# Patient Record
Sex: MALE | Race: BLACK OR AFRICAN AMERICAN | NOT HISPANIC OR LATINO | Employment: OTHER | URBAN - METROPOLITAN AREA
[De-identification: names, ages, dates, MRNs, and addresses within clinical notes are randomized per-mention and may not be internally consistent; named-entity substitution may affect disease eponyms.]

---

## 2018-11-13 ENCOUNTER — OFFICE VISIT CONVERTED (OUTPATIENT)
Dept: ORTHOPEDIC SURGERY | Facility: CLINIC | Age: 63
End: 2018-11-13
Attending: ORTHOPAEDIC SURGERY

## 2018-11-20 ENCOUNTER — OFFICE VISIT CONVERTED (OUTPATIENT)
Dept: SURGERY | Facility: CLINIC | Age: 63
End: 2018-11-20
Attending: NURSE PRACTITIONER

## 2018-12-04 ENCOUNTER — OFFICE VISIT CONVERTED (OUTPATIENT)
Dept: ORTHOPEDIC SURGERY | Facility: CLINIC | Age: 63
End: 2018-12-04
Attending: ORTHOPAEDIC SURGERY

## 2018-12-04 ENCOUNTER — CONVERSION ENCOUNTER (OUTPATIENT)
Dept: ORTHOPEDIC SURGERY | Facility: CLINIC | Age: 63
End: 2018-12-04

## 2019-02-01 ENCOUNTER — HOSPITAL ENCOUNTER (OUTPATIENT)
Dept: SURGERY | Facility: HOSPITAL | Age: 64
Setting detail: HOSPITAL OUTPATIENT SURGERY
Discharge: HOME | End: 2019-02-01
Attending: SURGERY

## 2021-05-16 VITALS — OXYGEN SATURATION: 97 % | HEIGHT: 71 IN | BODY MASS INDEX: 26.09 KG/M2 | WEIGHT: 186.37 LBS | HEART RATE: 76 BPM

## 2021-05-16 VITALS — RESPIRATION RATE: 16 BRPM | WEIGHT: 189.5 LBS | HEIGHT: 70 IN | BODY MASS INDEX: 27.13 KG/M2

## 2021-05-16 VITALS — OXYGEN SATURATION: 99 % | HEART RATE: 81 BPM | HEIGHT: 70 IN | WEIGHT: 189 LBS | BODY MASS INDEX: 27.06 KG/M2

## 2021-11-04 ENCOUNTER — TELEPHONE (OUTPATIENT)
Dept: GASTROENTEROLOGY | Facility: CLINIC | Age: 66
End: 2021-11-04

## 2021-11-04 ENCOUNTER — CLINICAL SUPPORT (OUTPATIENT)
Dept: GASTROENTEROLOGY | Facility: CLINIC | Age: 66
End: 2021-11-04

## 2021-11-04 ENCOUNTER — PREP FOR SURGERY (OUTPATIENT)
Dept: OTHER | Facility: HOSPITAL | Age: 66
End: 2021-11-04

## 2021-11-04 DIAGNOSIS — Z86.010 HX OF COLONIC POLYPS: ICD-10-CM

## 2021-11-04 DIAGNOSIS — Z12.11 SCREENING FOR MALIGNANT NEOPLASM OF COLON: Primary | ICD-10-CM

## 2021-11-04 RX ORDER — LEVOTHYROXINE SODIUM 88 UG/1
TABLET ORAL
COMMUNITY

## 2021-11-04 RX ORDER — IRBESARTAN 300 MG/1
300 TABLET ORAL NIGHTLY
COMMUNITY
Start: 2021-10-29 | End: 2022-05-24

## 2021-11-04 RX ORDER — TEMAZEPAM 30 MG/1
30 CAPSULE ORAL NIGHTLY PRN
COMMUNITY
Start: 2021-10-27

## 2021-11-04 RX ORDER — IBUPROFEN 800 MG/1
800 TABLET ORAL EVERY 6 HOURS PRN
COMMUNITY
Start: 2021-10-29

## 2021-11-04 RX ORDER — IRBESARTAN 150 MG
TABLET ORAL
COMMUNITY
Start: 2021-09-28 | End: 2022-02-10 | Stop reason: SDUPTHER

## 2021-11-04 RX ORDER — OXYCODONE AND ACETAMINOPHEN 10; 325 MG/1; MG/1
1 TABLET ORAL EVERY 4 HOURS PRN
COMMUNITY
Start: 2021-10-26

## 2021-11-04 NOTE — TELEPHONE ENCOUNTER
Steven Mahajan  REASON FOR CALL Colon Screening - hx of colon polyps   SENT IN PREP [facility] - pt states he has no issue of kidney or heart issues. No hx of seizures or constipation. Pt prefers [facility].   No past medical history on file.  Allergies   Allergen Reactions   • Cephalexin GI Intolerance   • Sulfa Antibiotics Unknown - Low Severity     Past Surgical History:   Procedure Laterality Date   • COLONOSCOPY      2018 with Dr. Pagan     Social History     Socioeconomic History   • Marital status:    Tobacco Use   • Smoking status: Never Smoker   Vaping Use   • Vaping Use: Never used   Substance and Sexual Activity   • Alcohol use: Yes     Comment: occasionally      Family History   Problem Relation Age of Onset   • Colon cancer Mother         passed at 61       Current Outpatient Medications:   •  Avapro 150 MG tablet, , Disp: , Rfl:   •  ibuprofen (ADVIL,MOTRIN) 800 MG tablet, , Disp: , Rfl:   •  irbesartan (AVAPRO) 300 MG tablet, , Disp: , Rfl:   •  levothyroxine (SYNTHROID, LEVOTHROID) 88 MCG tablet, levothyroxine 88 mcg oral tablet take 1 tablet (88 mcg) by oral route once daily   Active, Disp: , Rfl:   •  oxyCODONE-acetaminophen (PERCOCET)  MG per tablet, , Disp: , Rfl:   •  temazepam (RESTORIL) 30 MG capsule, , Disp: , Rfl:

## 2021-11-05 RX ORDER — PEG-3350, SODIUM SULFATE, SODIUM CHLORIDE, POTASSIUM CHLORIDE, SODIUM ASCORBATE AND ASCORBIC ACID 7.5-2.691G
1000 KIT ORAL EVERY 12 HOURS
Qty: 1 EACH | Refills: 0 | Status: SHIPPED | OUTPATIENT
Start: 2021-11-05 | End: 2021-11-24

## 2021-11-08 PROBLEM — Z86.0100 HX OF COLONIC POLYPS: Status: ACTIVE | Noted: 2021-11-08

## 2021-11-08 PROBLEM — Z86.010 HX OF COLONIC POLYPS: Status: ACTIVE | Noted: 2021-11-08

## 2021-11-08 PROBLEM — Z12.11 SCREENING FOR MALIGNANT NEOPLASM OF COLON: Status: ACTIVE | Noted: 2021-11-08

## 2021-11-24 ENCOUNTER — OFFICE VISIT (OUTPATIENT)
Dept: UROLOGY | Facility: CLINIC | Age: 66
End: 2021-11-24

## 2021-11-24 VITALS
BODY MASS INDEX: 24.64 KG/M2 | SYSTOLIC BLOOD PRESSURE: 141 MMHG | HEART RATE: 64 BPM | WEIGHT: 176 LBS | TEMPERATURE: 97.8 F | DIASTOLIC BLOOD PRESSURE: 75 MMHG | HEIGHT: 71 IN

## 2021-11-24 DIAGNOSIS — N40.1 BPH WITH OBSTRUCTION/LOWER URINARY TRACT SYMPTOMS: ICD-10-CM

## 2021-11-24 DIAGNOSIS — N13.8 BPH WITH OBSTRUCTION/LOWER URINARY TRACT SYMPTOMS: ICD-10-CM

## 2021-11-24 DIAGNOSIS — N32.81 OAB (OVERACTIVE BLADDER): ICD-10-CM

## 2021-11-24 DIAGNOSIS — R97.20 ELEVATED PROSTATE SPECIFIC ANTIGEN (PSA): Primary | ICD-10-CM

## 2021-11-24 PROCEDURE — 99202 OFFICE O/P NEW SF 15 MIN: CPT | Performed by: UROLOGY

## 2021-11-24 RX ORDER — MELOXICAM 15 MG/1
TABLET ORAL
COMMUNITY
End: 2021-11-24

## 2021-11-24 RX ORDER — VIBEGRON 75 MG/1
75 TABLET, FILM COATED ORAL DAILY
Qty: 90 TABLET | Refills: 3 | Status: SHIPPED | OUTPATIENT
Start: 2021-11-24 | End: 2021-12-13

## 2021-11-24 RX ORDER — TESTOSTERONE CYPIONATE 200 MG/ML
1 INJECTION, SOLUTION INTRAMUSCULAR
COMMUNITY

## 2021-11-24 RX ORDER — SILDENAFIL 100 MG/1
100 TABLET, FILM COATED ORAL AS NEEDED
COMMUNITY
Start: 2021-10-22

## 2021-11-24 RX ORDER — LORATADINE 10 MG/1
TABLET ORAL
COMMUNITY
End: 2021-12-13

## 2021-11-24 NOTE — PROGRESS NOTES
"Chief Complaint  Elevated PSA (4.1)    BPH with obstructive uropathy    Subjective  No acute distress        Steven Mahajan presents to [facility]  History of Present Illness    66-year-old -American male has elevation of his PSA for about 1 year's time.  PSA is 4.1.  Patient has small stream, urgency, nocturia with frequency.  He does not feel like he is emptying his bladder completely.  No history of prostate cancer in the family.  No dysuria or gross hematuria.  Overall AUA score is 23/35.  Patient has lot of urinary stoppage and dribbling.    Personal history.  Patient is  and has 3 children.  Does not smoke or drink.    Objective   Vital Signs:   /75   Pulse 64   Temp 97.8 °F (36.6 °C)   Ht 180.3 cm (71\")   Wt 79.8 kg (176 lb)   BMI 24.55 kg/m²     Allergies   Allergen Reactions   • Cephalexin GI Intolerance   • Sulfa Antibiotics Unknown - Low Severity      Review of Systems   Constitutional: Negative.    HENT: Negative.    Eyes: Negative.    Respiratory: Negative.    Cardiovascular: Negative.    Gastrointestinal: Negative.    Endocrine: Negative.    Genitourinary: Positive for difficulty urinating, frequency and urgency.   Musculoskeletal: Negative.    Skin: Negative.    Allergic/Immunologic: Negative.    Neurological: Negative.    Hematological: Negative.    Psychiatric/Behavioral: Negative.    All other systems reviewed and are negative.       Physical Exam  Constitutional:       General: He is not in acute distress.     Appearance: Normal appearance. He is normal weight. He is not ill-appearing or toxic-appearing.   HENT:      Head: Normocephalic and atraumatic.   Eyes:      Pupils: Pupils are equal, round, and reactive to light.   Cardiovascular:      Rate and Rhythm: Normal rate and regular rhythm.      Pulses: Normal pulses.      Heart sounds: Normal heart sounds. No murmur heard.      Pulmonary:      Effort: Pulmonary effort is normal.      Breath " sounds: Normal breath sounds. No rhonchi or rales.   Abdominal:      General: Bowel sounds are normal. There is no distension.      Tenderness: There is no abdominal tenderness. There is no right CVA tenderness or left CVA tenderness.   Genitourinary:     Penis: Normal.       Testes: Normal.      Comments: Prostate gland is almost 50 g.  Slightly irregular but no hard or firm areas present  Musculoskeletal:         General: No swelling. Normal range of motion.      Cervical back: Normal range of motion and neck supple. No rigidity or tenderness.   Lymphadenopathy:      Cervical: No cervical adenopathy.   Skin:     General: Skin is warm.      Coloration: Skin is not jaundiced.   Neurological:      General: No focal deficit present.      Mental Status: He is alert and oriented to person, place, and time.      Motor: No weakness.      Gait: Gait normal.   Psychiatric:         Mood and Affect: Mood normal.         Behavior: Behavior normal.         Thought Content: Thought content normal.         Judgment: Judgment normal.        Result Review :                 Assessment and Plan    Diagnoses and all orders for this visit:    1. OAB (overactive bladder) (Primary)  -     Vibegron (Gemtesa) 75 MG tablet; Take 1 tablet by mouth Daily.  Dispense: 90 tablet; Refill: 3    2. Elevated prostate specific antigen (PSA)    3. BPH with obstruction/lower urinary tract symptoms    I am going to do MRI of prostate on the patient and see him after that.  History of cystoscopy to see what is going on with the prostate and urinary bladder.    Follow Up   No follow-ups on file.  Patient was given instructions and counseling regarding his condition or for health maintenance advice. Please see specific information pulled into the AVS if appropriate.     Jamie Ramírez MD

## 2021-12-07 ENCOUNTER — TELEPHONE (OUTPATIENT)
Dept: UROLOGY | Facility: CLINIC | Age: 66
End: 2021-12-07

## 2021-12-13 RX ORDER — FLUTICASONE PROPIONATE 50 MCG
2 SPRAY, SUSPENSION (ML) NASAL DAILY
COMMUNITY

## 2021-12-13 NOTE — PRE-PROCEDURE INSTRUCTIONS
Pt. Instructed on laxative and skin prep, pre-op meds, clear liquid diet. Ok to take Flonase, Synthroid, Percocet, a.m. of procedure.       Fully vaccinated

## 2021-12-14 ENCOUNTER — ANESTHESIA EVENT (OUTPATIENT)
Dept: GASTROENTEROLOGY | Facility: HOSPITAL | Age: 66
End: 2021-12-14

## 2021-12-14 ENCOUNTER — HOSPITAL ENCOUNTER (OUTPATIENT)
Facility: HOSPITAL | Age: 66
Setting detail: HOSPITAL OUTPATIENT SURGERY
Discharge: HOME | End: 2021-12-14
Attending: INTERNAL MEDICINE | Admitting: INTERNAL MEDICINE

## 2021-12-14 ENCOUNTER — ANESTHESIA (OUTPATIENT)
Dept: GASTROENTEROLOGY | Facility: HOSPITAL | Age: 66
End: 2021-12-14

## 2021-12-14 VITALS
BODY MASS INDEX: 24.91 KG/M2 | OXYGEN SATURATION: 99 % | TEMPERATURE: 98.3 F | DIASTOLIC BLOOD PRESSURE: 74 MMHG | HEART RATE: 48 BPM | SYSTOLIC BLOOD PRESSURE: 158 MMHG | RESPIRATION RATE: 21 BRPM | WEIGHT: 178.57 LBS

## 2021-12-14 DIAGNOSIS — Z12.11 SCREENING FOR MALIGNANT NEOPLASM OF COLON: ICD-10-CM

## 2021-12-14 DIAGNOSIS — Z86.010 HX OF COLONIC POLYPS: ICD-10-CM

## 2021-12-14 PROCEDURE — 45385 COLONOSCOPY W/LESION REMOVAL: CPT | Performed by: INTERNAL MEDICINE

## 2021-12-14 PROCEDURE — 88305 TISSUE EXAM BY PATHOLOGIST: CPT | Performed by: INTERNAL MEDICINE

## 2021-12-14 PROCEDURE — 25010000002 PROPOFOL 10 MG/ML EMULSION: Performed by: NURSE ANESTHETIST, CERTIFIED REGISTERED

## 2021-12-14 RX ORDER — SODIUM CHLORIDE, SODIUM LACTATE, POTASSIUM CHLORIDE, CALCIUM CHLORIDE 600; 310; 30; 20 MG/100ML; MG/100ML; MG/100ML; MG/100ML
30 INJECTION, SOLUTION INTRAVENOUS CONTINUOUS
Status: DISCONTINUED | OUTPATIENT
Start: 2021-12-14 | End: 2021-12-14 | Stop reason: HOSPADM

## 2021-12-14 RX ORDER — LIDOCAINE HYDROCHLORIDE 20 MG/ML
INJECTION, SOLUTION INFILTRATION; PERINEURAL AS NEEDED
Status: DISCONTINUED | OUTPATIENT
Start: 2021-12-14 | End: 2021-12-14 | Stop reason: SURG

## 2021-12-14 RX ADMIN — LIDOCAINE HYDROCHLORIDE 40 MG: 20 INJECTION, SOLUTION INFILTRATION; PERINEURAL at 11:38

## 2021-12-14 RX ADMIN — SODIUM CHLORIDE, POTASSIUM CHLORIDE, SODIUM LACTATE AND CALCIUM CHLORIDE 30 ML/HR: 600; 310; 30; 20 INJECTION, SOLUTION INTRAVENOUS at 11:17

## 2021-12-14 RX ADMIN — PROPOFOL 250 MCG/KG/MIN: 10 INJECTION, EMULSION INTRAVENOUS at 11:38

## 2021-12-14 NOTE — ANESTHESIA POSTPROCEDURE EVALUATION
Patient: Steven Mahajan    Procedure Summary     Date: 12/14/21 Room / Location: [facility] ENDOSCOPY 4 / [facility] ENDOSCOPY    Anesthesia Start: 1138 Anesthesia Stop: 1224    Procedure: COLONOSCOPY WITH POLYPECTOMIES (N/A ) Diagnosis:       Screening for malignant neoplasm of colon      Hx of colonic polyps      (Screening for malignant neoplasm of colon [Z12.11])      (Hx of colonic polyps [Z86.010])    Surgeons: Zackary Barcenas MD Provider: Ahsan Pimentel MD    Anesthesia Type: general ASA Status: 2          Anesthesia Type: general    Vitals  Vitals Value Taken Time   /74 12/14/21 1236   Temp 36.8 °C (98.3 °F) 12/14/21 1236   Pulse 48 12/14/21 1236   Resp 21 12/14/21 1236   SpO2 99 % 12/14/21 1236           Post Anesthesia Care and Evaluation    Patient location during evaluation: bedside  Patient participation: complete - patient participated  Level of consciousness: awake  Pain management: adequate  Airway patency: patent  Anesthetic complications: No anesthetic complications  PONV Status: none  Cardiovascular status: acceptable and stable  Respiratory status: acceptable  Hydration status: acceptable    Comments: An Anesthesiologist personally participated in the most demanding procedures (including induction and emergence if applicable) in the anesthesia plan, monitored the course of anesthesia administration at frequent intervals and remained physically present and available for immediate diagnosis and treatment of emergencies.

## 2021-12-14 NOTE — H&P
Pre Procedure History & Physical    Chief Complaint:   Screening    Subjective     HPI:   Colon polyp cancer screening.  Patient had colon polyps in 2018 and patient mother had colon cancer at age 60    Past Medical History:   Past Medical History:   Diagnosis Date   • Allergies    • Disease of thyroid gland    • Elevated PSA    • Erectile dysfunction    • Hypertension        Past Surgical History:  Past Surgical History:   Procedure Laterality Date   • COLONOSCOPY      2018 with Dr. Pagan   • HERNIA REPAIR     • KNEE ARTHROSCOPY WITH PATELLA FEMORAL LIGAMENT RECONSTRUCTION      Bilateral   • ROTATOR CUFF REPAIR      LEFT        Family History:  Family History   Problem Relation Age of Onset   • Colon cancer Mother         passed at 61   • Malig Hyperthermia Neg Hx        Social History:   reports that he has quit smoking. He has a 15.00 pack-year smoking history. He has never used smokeless tobacco. He reports current alcohol use. He reports that he does not use drugs.    Medications:   Medications Prior to Admission   Medication Sig Dispense Refill Last Dose   • fluticasone (FLONASE) 50 MCG/ACT nasal spray 2 sprays into the nostril(s) as directed by provider Daily.      • ibuprofen (ADVIL,MOTRIN) 800 MG tablet Take 800 mg by mouth Every 6 (Six) Hours As Needed.      • irbesartan (AVAPRO) 300 MG tablet Take 300 mg by mouth Every Night.      • levothyroxine (SYNTHROID, LEVOTHROID) 88 MCG tablet levothyroxine 88 mcg oral tablet take 1 tablet (88 mcg) by oral route once daily   Active      • oxyCODONE-acetaminophen (PERCOCET)  MG per tablet Take 1 tablet by mouth Every 4 (Four) Hours As Needed.      • sildenafil (VIAGRA) 100 MG tablet Take 100 mg by mouth As Needed.      • temazepam (RESTORIL) 30 MG capsule Take 30 mg by mouth At Night As Needed.      • Testosterone Cypionate (DEPOTESTOTERONE CYPIONATE) 200 MG/ML injection 1 mL.      • Avapro 150 MG tablet           Allergies:  Cephalexin and Sulfa  antibiotics        Objective     Weight 81 kg (178 lb 9.2 oz).    Physical Exam   Constitutional: Pt is oriented to person, place, and time and well-developed, well-nourished, and in no distress.   Mouth/Throat: Oropharynx is clear and moist.   Neck: Normal range of motion.   Cardiovascular: Normal rate, regular rhythm and normal heart sounds.    Pulmonary/Chest: Effort normal and breath sounds normal.   Abdominal: Soft. Nontender  Skin: Skin is warm and dry.   Psychiatric: Mood, memory, affect and judgment normal.     Assessment/Plan     Diagnosis:  Colon cancer screening    Anticipated Surgical Procedure:  Colonoscopy    The risks, benefits, and alternatives of this procedure have been discussed with the patient or the responsible party- the patient understands and agrees to proceed.

## 2021-12-14 NOTE — ANESTHESIA PREPROCEDURE EVALUATION
Anesthesia Evaluation     Patient summary reviewed and Nursing notes reviewed   no history of anesthetic complications:  NPO Solid Status: > 8 hours  NPO Liquid Status: > 2 hours           Airway   Mallampati: I  TM distance: >3 FB  Neck ROM: full  No difficulty expected  Dental      Pulmonary - negative pulmonary ROS and normal exam    breath sounds clear to auscultation  Cardiovascular - normal exam  Exercise tolerance: good (4-7 METS)    Rhythm: regular    (+) hypertension,       Neuro/Psych- negative ROS  GI/Hepatic/Renal/Endo    (+)   thyroid problem hypothyroidism    Musculoskeletal     Abdominal    Substance History      OB/GYN          Other                        Anesthesia Plan    ASA 2     general   total IV anesthesia    Anesthetic plan, all risks, benefits, and alternatives have been provided, discussed and informed consent has been obtained with: patient.

## 2021-12-15 LAB
CYTO UR: NORMAL
LAB AP CASE REPORT: NORMAL
LAB AP CLINICAL INFORMATION: NORMAL
PATH REPORT.FINAL DX SPEC: NORMAL
PATH REPORT.GROSS SPEC: NORMAL

## 2021-12-21 ENCOUNTER — TELEPHONE (OUTPATIENT)
Dept: UROLOGY | Facility: CLINIC | Age: 66
End: 2021-12-21

## 2022-02-10 ENCOUNTER — OFFICE VISIT (OUTPATIENT)
Dept: UROLOGY | Facility: CLINIC | Age: 67
End: 2022-02-10

## 2022-02-10 VITALS
HEIGHT: 71 IN | TEMPERATURE: 98.2 F | BODY MASS INDEX: 24.92 KG/M2 | WEIGHT: 178 LBS | DIASTOLIC BLOOD PRESSURE: 75 MMHG | SYSTOLIC BLOOD PRESSURE: 119 MMHG | HEART RATE: 97 BPM

## 2022-02-10 DIAGNOSIS — N13.8 BPH WITH OBSTRUCTION/LOWER URINARY TRACT SYMPTOMS: Primary | ICD-10-CM

## 2022-02-10 DIAGNOSIS — N13.8 BPH WITH OBSTRUCTION/LOWER URINARY TRACT SYMPTOMS: ICD-10-CM

## 2022-02-10 DIAGNOSIS — N40.1 BPH WITH OBSTRUCTION/LOWER URINARY TRACT SYMPTOMS: Primary | ICD-10-CM

## 2022-02-10 DIAGNOSIS — R97.20 ELEVATED PROSTATE SPECIFIC ANTIGEN (PSA): Primary | ICD-10-CM

## 2022-02-10 DIAGNOSIS — N40.1 BPH WITH OBSTRUCTION/LOWER URINARY TRACT SYMPTOMS: ICD-10-CM

## 2022-02-10 PROCEDURE — 99214 OFFICE O/P EST MOD 30 MIN: CPT | Performed by: UROLOGY

## 2022-02-10 RX ORDER — ZOLPIDEM TARTRATE 5 MG/1
TABLET ORAL
COMMUNITY
Start: 2021-12-01 | End: 2022-05-24

## 2022-02-10 RX ORDER — TAMSULOSIN HYDROCHLORIDE 0.4 MG/1
1 CAPSULE ORAL DAILY
Qty: 90 CAPSULE | Refills: 3 | Status: SHIPPED | OUTPATIENT
Start: 2022-02-10 | End: 2022-05-11

## 2022-02-10 NOTE — PROGRESS NOTES
"Chief Complaint  Results (mri)    Elevated PSA    BPH    Subjective          Steven Mahajan presents to [facility] UR[facility]  History of Present Illness    Patient continues to have difficulty with urination.  Patient tried Gemtesa but his insurance does not pay for it.  His problem was of urinary stoppage and restarting again and again    Objective No acute distress  Vital Signs:   /75   Pulse 97   Temp 98.2 °F (36.8 °C)   Ht 180.3 cm (71\")   Wt 80.7 kg (178 lb)   BMI 24.83 kg/m²     Allergies   Allergen Reactions   • Cephalexin GI Intolerance   • Sulfa Antibiotics Unknown - Low Severity      Past medical history:  has a past medical history of Allergies, Disease of thyroid gland, Elevated PSA, Erectile dysfunction, and Hypertension.   Past surgical history:  has a past surgical history that includes Colonoscopy; Hernia repair; knee arthroscopy with patella femoral ligament reconstruction; Rotator cuff repair; and Colonoscopy (N/A, 12/14/2021).  Personal history: family history includes Colon cancer in his mother.  Social history:  reports that he has quit smoking. He has a 15.00 pack-year smoking history. He has never used smokeless tobacco. He reports current alcohol use. He reports that he does not use drugs.    Review of Systems    No change from before    Physical Exam  Constitutional:       General: He is not in acute distress.     Appearance: Normal appearance. He is normal weight. He is not ill-appearing or toxic-appearing.   HENT:      Head: Normocephalic and atraumatic.   Skin:     General: Skin is warm.      Coloration: Skin is not jaundiced.   Neurological:      General: No focal deficit present.      Mental Status: He is alert and oriented to person, place, and time.      Motor: No weakness.      Gait: Gait normal.   Psychiatric:         Mood and Affect: Mood normal.         Behavior: Behavior normal.         Thought Content: Thought content normal.         Judgment: " Judgment normal.        Result Review :                 Assessment and Plan    Diagnoses and all orders for this visit:    1. Elevated prostate specific antigen (PSA) (Primary)    2. BPH with obstruction/lower urinary tract symptoms    MRI of prostate is examined and discussed with the patient.  There are no PI-RADS 3 lesions present and patient has a large median lobe.  I will start him on tamsulosin 0.4 mg daily and do cystoscopy on him next week to look at the status of prostatic urethra and urinary bladder    Follow Up   No follow-ups on file.  Patient was given instructions and counseling regarding his condition or for health maintenance advice. Please see specific information pulled into the AVS if appropriate.     Jamie Ramírez MD

## 2022-02-22 ENCOUNTER — OFFICE VISIT (OUTPATIENT)
Dept: UROLOGY | Facility: CLINIC | Age: 67
End: 2022-02-22

## 2022-02-22 DIAGNOSIS — N40.1 BPH WITH OBSTRUCTION/LOWER URINARY TRACT SYMPTOMS: Primary | ICD-10-CM

## 2022-02-22 DIAGNOSIS — R97.20 ELEVATED PROSTATE SPECIFIC ANTIGEN (PSA): ICD-10-CM

## 2022-02-22 DIAGNOSIS — N13.8 BPH WITH OBSTRUCTION/LOWER URINARY TRACT SYMPTOMS: Primary | ICD-10-CM

## 2022-02-22 PROCEDURE — 52000 CYSTOURETHROSCOPY: CPT | Performed by: UROLOGY

## 2022-02-22 NOTE — PROGRESS NOTES
Cystoscopy    Date/Time: 2/22/2022 3:22 PM  Performed by: Jamie Ramírez MD  Authorized by: Jamie Ramírez MD   Preparation: Patient was prepped and draped in the usual sterile fashion.  Local anesthesia used: yes    Anesthesia:  Local anesthesia used: yes  Local Anesthetic: topical anesthetic  Anesthetic total: 12 mL    Sedation:  Patient sedated: no        Indication.  Large prostate with voiding difficulties.    Patient was placed in lithotomy position.  Thorough scrubbing her lower abdomen external genitalia was performed with Hibiclens.  18 Olympus flexible cystoscope was inserted into the urethra which was normal.  Prostate gland is large and obstructive.  Patient has large lateral lobes and median lobe.  Bladder is trabeculated.  Both ureteral orifices are normal.  Bladder was looked at carefully and I do not see any bladder tumors.  There was no evidence of vesicocolic fistula.  Flexible cystoscope was removed and patient tolerated the procedure very well.    Patient started urinating while the instrument was in the bladder and during voiding his bladder neck opens up pretty nicely.  We will continue tamsulosin for the patient recheck him in 3 months time

## 2022-05-24 ENCOUNTER — OFFICE VISIT (OUTPATIENT)
Dept: UROLOGY | Facility: CLINIC | Age: 67
End: 2022-05-24

## 2022-05-24 VITALS
HEIGHT: 71 IN | BODY MASS INDEX: 24.92 KG/M2 | SYSTOLIC BLOOD PRESSURE: 123 MMHG | HEART RATE: 79 BPM | TEMPERATURE: 98.6 F | DIASTOLIC BLOOD PRESSURE: 64 MMHG | WEIGHT: 178 LBS

## 2022-05-24 DIAGNOSIS — N40.1 BPH WITH OBSTRUCTION/LOWER URINARY TRACT SYMPTOMS: Primary | ICD-10-CM

## 2022-05-24 DIAGNOSIS — R39.15 URGENCY OF URINATION: ICD-10-CM

## 2022-05-24 DIAGNOSIS — R97.20 ELEVATED PROSTATE SPECIFIC ANTIGEN (PSA): ICD-10-CM

## 2022-05-24 DIAGNOSIS — N13.8 BPH WITH OBSTRUCTION/LOWER URINARY TRACT SYMPTOMS: Primary | ICD-10-CM

## 2022-05-24 LAB
BILIRUB BLD-MCNC: NEGATIVE MG/DL
CLARITY, POC: CLEAR
COLOR UR: YELLOW
EXPIRATION DATE: NORMAL
GLUCOSE UR STRIP-MCNC: NEGATIVE MG/DL
KETONES UR QL: NEGATIVE
LEUKOCYTE EST, POC: NEGATIVE
Lab: NORMAL
NITRITE UR-MCNC: NEGATIVE MG/ML
PH UR: 5 [PH] (ref 5–8)
PROT UR STRIP-MCNC: NEGATIVE MG/DL
RBC # UR STRIP: NEGATIVE /UL
SP GR UR: 1.02 (ref 1–1.03)
UROBILINOGEN UR QL: NORMAL

## 2022-05-24 PROCEDURE — 81003 URINALYSIS AUTO W/O SCOPE: CPT | Performed by: UROLOGY

## 2022-05-24 PROCEDURE — 99213 OFFICE O/P EST LOW 20 MIN: CPT | Performed by: UROLOGY

## 2022-05-24 RX ORDER — HYDROCHLOROTHIAZIDE 12.5 MG/1
CAPSULE, GELATIN COATED ORAL
COMMUNITY
Start: 2022-05-11

## 2022-05-24 RX ORDER — TAMSULOSIN HYDROCHLORIDE 0.4 MG/1
CAPSULE ORAL
COMMUNITY
Start: 2022-05-11

## 2022-05-24 NOTE — PROGRESS NOTES
"Chief Complaint  Benign Prostatic Hypertrophy    Urgency    Subjective  Patient is markedly improved except has urgency        Steven Mahajan presents to [facility]  History of Present Illness    67-year-old -American male has been having a lot of problem.  Urination but since he started tamsulosin is doing much better.  His nocturia is markedly improved and he gets up only twice at night.  He is emptying his urinary bladder but continues to have a lot of frequency.  No dysuria or gross hematuria and no history of prostate cancer in the family.  Patient drinks a lot of fluids in the daytime    Objective No acute distress  Vital Signs:   /64   Pulse 79   Temp 98.6 °F (37 °C)   Ht 180.3 cm (71\")   Wt 80.7 kg (178 lb)   BMI 24.83 kg/m²     Allergies   Allergen Reactions   • Cephalexin GI Intolerance   • Sulfa Antibiotics Unknown - Low Severity      Past medical history:  has a past medical history of Allergies, Disease of thyroid gland, Elevated PSA, Erectile dysfunction, and Hypertension.   Past surgical history:  has a past surgical history that includes Colonoscopy; Hernia repair; knee arthroscopy with patella femoral ligament reconstruction; Rotator cuff repair; and Colonoscopy (N/A, 12/14/2021).  Personal history: family history includes Colon cancer in his mother.  Social history:  reports that he has quit smoking. He has a 15.00 pack-year smoking history. He has never used smokeless tobacco. He reports current alcohol use. He reports that he does not use drugs.    Review of Systems    Please see past medical and surgical history rest of the system is negative    Physical Exam  Constitutional:       General: He is not in acute distress.     Appearance: Normal appearance. He is normal weight. He is not ill-appearing or toxic-appearing.   HENT:      Head: Normocephalic and atraumatic.      Ears:      Comments: No hearing loss  Abdominal:      General: Abdomen is flat. "      Palpations: Abdomen is soft. There is no mass.      Tenderness: There is no abdominal tenderness. There is no right CVA tenderness or left CVA tenderness.   Genitourinary:     Penis: Normal.       Testes: Normal.   Musculoskeletal:         General: No swelling. Normal range of motion.   Skin:     General: Skin is warm.      Coloration: Skin is not jaundiced.   Neurological:      General: No focal deficit present.      Mental Status: He is alert and oriented to person, place, and time.      Motor: No weakness.      Gait: Gait normal.   Psychiatric:         Mood and Affect: Mood normal.         Behavior: Behavior normal.         Thought Content: Thought content normal.         Judgment: Judgment normal.        Result Review :                 Assessment and Plan    Diagnoses and all orders for this visit:    1. BPH with obstruction/lower urinary tract symptoms (Primary)  -     POC Urinalysis Dipstick, Automated    2. Urgency of urination    3. Elevated prostate specific antigen (PSA)  -     PSA Diagnostic; Future      His last PSA was 4.1 so we will going to repeat it and recheck him in 6 months time.  I have discussed with him Kegel exercises which he can make his sphincter stronger and that can postpone his frequency and urgency.  I have given him 2-week sample of Gemtesa if he wants to try that and see if it will help and I can continue giving it to him as a prescription  Brief Urine Lab Results  (Last result in the past 365 days)      Color   Clarity   Blood   Leuk Est   Nitrite   Protein   CREAT   Urine HCG        05/24/22 1602 Yellow   Clear   Negative   Negative   Negative   Negative                  Follow Up   No follow-ups on file.  Patient was given instructions and counseling regarding his condition or for health maintenance advice. Please see specific information pulled into the AVS if appropriate.     Jamie Ramírez MD

## 2022-06-07 ENCOUNTER — OFFICE VISIT (OUTPATIENT)
Dept: ORTHOPEDIC SURGERY | Facility: CLINIC | Age: 67
End: 2022-06-07

## 2022-06-07 VITALS — BODY MASS INDEX: 24.92 KG/M2 | HEIGHT: 71 IN | HEART RATE: 60 BPM | OXYGEN SATURATION: 97 % | WEIGHT: 178 LBS

## 2022-06-07 DIAGNOSIS — S46.001A INJURY OF RIGHT ROTATOR CUFF, INITIAL ENCOUNTER: Primary | ICD-10-CM

## 2022-06-07 PROCEDURE — 99203 OFFICE O/P NEW LOW 30 MIN: CPT | Performed by: ORTHOPAEDIC SURGERY

## 2022-06-07 NOTE — PROGRESS NOTES
"Chief Complaint   Pain and Initial Evaluation of the Right Shoulder     Subjective      Steven Mahajan presents to [facility] for evaluation of the right shoulder. He has previously had a left shoulder rotator cuff repair and reports his right shoulder symptoms are similar. He reports pain with overhead activity. He reports pain at night and can not lay on that side. He has previously had a right shoulder arthroscopy in 1990. He has no other complaints. He has tried home exercises with no relief.     Allergies   Allergen Reactions   • Cephalexin GI Intolerance   • Sulfa Antibiotics Unknown - Low Severity        Social History     Socioeconomic History   • Marital status:    Tobacco Use   • Smoking status: Former Smoker     Packs/day: 1.00     Years: 15.00     Pack years: 15.00   • Smokeless tobacco: Never Used   • Tobacco comment: quit 1989   Vaping Use   • Vaping Use: Never used   Substance and Sexual Activity   • Alcohol use: Yes     Comment: occasionally    • Drug use: Never   • Sexual activity: Defer        Review of Systems     Objective   Vital Signs:   Pulse 60   Ht 180.3 cm (71\")   Wt 80.7 kg (178 lb)   SpO2 97%   BMI 24.83 kg/m²       Physical Exam  Constitutional:       Appearance: Normal appearance. The patient is well-developed and normal weight.   HENT:      Head: Normocephalic.      Right Ear: Hearing and external ear normal.      Left Ear: Hearing and external ear normal.      Nose: Nose normal.   Eyes:      Conjunctiva/sclera: Conjunctivae normal.   Cardiovascular:      Rate and Rhythm: Normal rate.   Pulmonary:      Effort: Pulmonary effort is normal.      Breath sounds: No wheezing or rales.   Abdominal:      Palpations: Abdomen is soft.      Tenderness: There is no abdominal tenderness.   Musculoskeletal:      Cervical back: Normal range of motion.   Skin:     Findings: No rash.   Neurological:      Mental Status: The patient is alert and oriented to " person, place, and time.   Psychiatric:         Mood and Affect: Mood and affect normal.         Judgment: Judgment normal.       Ortho Exam      Right shoulder- well healed scar to acromioclavicular joint. Non-tender. Neurovascularly intact. Sensation to light touch median, radial, ulnar nerve. Positive AIN, PIN, ulnar nerve. Positive pulses. Forward elevation 170. Abduction 140. External Rotation 85. Internal rotation 75. 4/5 supraspinatus strength. 5/5 infraspinatus  And subscapularis. Internal rotation to T-12. Negative lift off. Negative cross arm adduction and impingement testing. Negative O'yas     Procedures      Imaging Results (Most Recent)     None           Result Review :       No results found.           Assessment and Plan     Diagnoses and all orders for this visit:    1. Injury of right rotator cuff, initial encounter (Primary)        Discussed the treatment plan with the patient.  I reviewed his x-rays. Plan for MRI of the right shoulder to evaluate the rotator cuff.     Call or return if worsening symptoms.    Follow Up     After MRI      Patient was given instructions and counseling regarding his condition or for health maintenance advice. Please see specific information pulled into the AVS if appropriate.     Scribed for Bethel Juan MD by Amber Calvo.  06/07/22   11:31 EDT    I have personally performed the services described in this document as scribed by the above individual and it is both accurate and complete. Bethel Juan MD 06/07/22

## 2022-06-16 DIAGNOSIS — S46.001A INJURY OF RIGHT ROTATOR CUFF, INITIAL ENCOUNTER: ICD-10-CM

## 2022-06-21 ENCOUNTER — OFFICE VISIT (OUTPATIENT)
Dept: ORTHOPEDIC SURGERY | Facility: CLINIC | Age: 67
End: 2022-06-21

## 2022-06-21 VITALS — HEART RATE: 78 BPM | WEIGHT: 161 LBS | BODY MASS INDEX: 22.54 KG/M2 | OXYGEN SATURATION: 96 % | HEIGHT: 71 IN

## 2022-06-21 DIAGNOSIS — M75.111 INCOMPLETE TEAR OF RIGHT ROTATOR CUFF, UNSPECIFIED WHETHER TRAUMATIC: ICD-10-CM

## 2022-06-21 DIAGNOSIS — M75.81 ROTATOR CUFF TENDONITIS, RIGHT: Primary | ICD-10-CM

## 2022-06-21 PROCEDURE — 99213 OFFICE O/P EST LOW 20 MIN: CPT | Performed by: ORTHOPAEDIC SURGERY

## 2022-06-21 PROCEDURE — 20610 DRAIN/INJ JOINT/BURSA W/O US: CPT | Performed by: ORTHOPAEDIC SURGERY

## 2022-06-21 RX ADMIN — TRIAMCINOLONE ACETONIDE 40 MG: 40 INJECTION, SUSPENSION INTRA-ARTICULAR; INTRAMUSCULAR at 11:35

## 2022-06-21 RX ADMIN — LIDOCAINE HYDROCHLORIDE 5 ML: 10 INJECTION, SOLUTION INFILTRATION; PERINEURAL at 11:35

## 2022-06-21 NOTE — PROGRESS NOTES
"Chief Complaint  Pain and Follow-up of the Right Shoulder     Subjective      Steven Mahajan presents to [facility] for follow up evaluation of the right shoulder. The patient recently had an MRI and is here today for the results. To review, He has previously had a left shoulder rotator cuff repair and reports his right shoulder symptoms are similar. He reports pain with overhead activity. He reports pain at night and can not lay on that side. He has previously had a right shoulder arthroscopy in 1990. He has no other complaints. He has tried home exercises with no relief.     Allergies   Allergen Reactions   • Cephalexin GI Intolerance   • Sulfa Antibiotics Unknown - Low Severity        Social History     Socioeconomic History   • Marital status:    Tobacco Use   • Smoking status: Former Smoker     Packs/day: 1.00     Years: 15.00     Pack years: 15.00   • Smokeless tobacco: Never Used   • Tobacco comment: quit 1989   Vaping Use   • Vaping Use: Never used   Substance and Sexual Activity   • Alcohol use: Yes     Comment: occasionally    • Drug use: Never   • Sexual activity: Defer        Review of Systems     Objective   Vital Signs:   Pulse 78   Ht 180.3 cm (71\")   Wt 73 kg (161 lb)   SpO2 96%   BMI 22.45 kg/m²       Physical Exam  Constitutional:       Appearance: Normal appearance. The patient is well-developed and normal weight.   HENT:      Head: Normocephalic.      Right Ear: Hearing and external ear normal.      Left Ear: Hearing and external ear normal.      Nose: Nose normal.   Eyes:      Conjunctiva/sclera: Conjunctivae normal.   Cardiovascular:      Rate and Rhythm: Normal rate.   Pulmonary:      Effort: Pulmonary effort is normal.      Breath sounds: No wheezing or rales.   Abdominal:      Palpations: Abdomen is soft.      Tenderness: There is no abdominal tenderness.   Musculoskeletal:      Cervical back: Normal range of motion.   Skin:     Findings: No rash. "   Neurological:      Mental Status: The patient is alert and oriented to person, place, and time.   Psychiatric:         Mood and Affect: Mood and affect normal.         Judgment: Judgment normal.       Ortho Exam      Right shoulder- well healed scar to acromioclavicular joint. Non-tender. Neurovascularly intact. Sensation to light touch median, radial, ulnar nerve. Positive AIN, PIN, ulnar nerve. Positive pulses. Forward elevation 170. Abduction 140. External Rotation 85. Internal rotation 75. 4/5 supraspinatus strength. 5/5 infraspinatus  And subscapularis. Internal rotation to T-12. Negative lift off. Negative cross arm adduction and impingement testing. Negative O'yas     Large Joint Arthrocentesis  Date/Time: 6/21/2022 11:35 AM  Consent given by: patient  Site marked: site marked  Timeout: Immediately prior to procedure a time out was called to verify the correct patient, procedure, equipment, support staff and site/side marked as required   Procedure Details  Location: shoulder - Shoulder joint: Right shoulder.  Needle gauge: 21g.  Medications administered: 5 mL lidocaine 1 %; 40 mg triamcinolone acetonide 40 MG/ML  Patient tolerance: patient tolerated the procedure well with no immediate complications          [place] MRI-  1. Mild supraspinatus and infraspinatus tendinopathy. Small partial-thickness interstitial tear of the central insertional supraspinatus   tendon, involving less than 50% thickness. No high-grade partial-thickness or full-thickness rotator cuff tear. 2. Intact long head biceps   tendon and glenoid labrum. 3. Evidence of prior acromioplasty and resection of the distal clavicle. No recurrent outlet impingement or   significant bursal inflammation.    Imaging Results (Most Recent)     None           Result Review :       No results found.           Assessment and Plan     Diagnoses and all orders for this visit:    1. Rotator cuff tendonitis, right (Primary)    2. Incomplete tear of  right rotator cuff, unspecified whether traumatic        Discussed the treatment plan with the patient.  I reviewed the MRI with the patient. Plan for conservative treatment at this time. Plan to proceed with physical therapy. Discussed the risks and benefits of a right shoulder steroid injection. The patient expressed understanding and wished to proceed. He tolerated the injection well.     Call or return if worsening symptoms.    Follow Up     6 weeks      Patient was given instructions and counseling regarding his condition or for health maintenance advice. Please see specific information pulled into the AVS if appropriate.     Scribed for Bethel Juan MD by Amber Calvo.  06/21/22   11:08 EDT    I have personally performed the services described in this document as scribed by the above individual and it is both accurate and complete. Bethel Juan MD 06/22/22

## 2022-06-22 RX ORDER — LIDOCAINE HYDROCHLORIDE 10 MG/ML
5 INJECTION, SOLUTION INFILTRATION; PERINEURAL
Status: COMPLETED | OUTPATIENT
Start: 2022-06-21 | End: 2022-06-21

## 2022-06-22 RX ORDER — TRIAMCINOLONE ACETONIDE 40 MG/ML
40 INJECTION, SUSPENSION INTRA-ARTICULAR; INTRAMUSCULAR
Status: COMPLETED | OUTPATIENT
Start: 2022-06-21 | End: 2022-06-21

## 2022-08-02 ENCOUNTER — OFFICE VISIT (OUTPATIENT)
Dept: ORTHOPEDIC SURGERY | Facility: CLINIC | Age: 67
End: 2022-08-02

## 2022-08-02 VITALS — WEIGHT: 163 LBS | OXYGEN SATURATION: 98 % | HEART RATE: 88 BPM | BODY MASS INDEX: 22.82 KG/M2 | HEIGHT: 71 IN

## 2022-08-02 DIAGNOSIS — M75.81 ROTATOR CUFF TENDONITIS, RIGHT: Primary | ICD-10-CM

## 2022-08-02 DIAGNOSIS — M75.111 INCOMPLETE TEAR OF RIGHT ROTATOR CUFF, UNSPECIFIED WHETHER TRAUMATIC: ICD-10-CM

## 2022-08-02 DIAGNOSIS — M54.12 CERVICAL RADICULAR PAIN: ICD-10-CM

## 2022-08-02 PROCEDURE — 99213 OFFICE O/P EST LOW 20 MIN: CPT | Performed by: ORTHOPAEDIC SURGERY

## 2022-08-02 NOTE — PROGRESS NOTES
"Chief Complaint  Pain and Follow-up of the Right Shoulder     Subjective      Steven Mahajan presents to [facility] for follow up evaluation of the right shoulder. The patient has been treating his right shoulder tendonitis and incomplete rotator cuff tear conservatively. He reports the previous injection gave him significant relief. He has been having physical therapy on his neck for his radicular pain.     Allergies   Allergen Reactions   • Cephalexin GI Intolerance   • Sulfa Antibiotics Unknown - Low Severity        Social History     Socioeconomic History   • Marital status:    Tobacco Use   • Smoking status: Former Smoker     Packs/day: 1.00     Years: 15.00     Pack years: 15.00   • Smokeless tobacco: Never Used   • Tobacco comment: quit 1989   Vaping Use   • Vaping Use: Never used   Substance and Sexual Activity   • Alcohol use: Yes     Comment: occasionally    • Drug use: Never   • Sexual activity: Defer        Review of Systems     Objective   Vital Signs:   Pulse 88   Ht 180.3 cm (71\")   Wt 73.9 kg (163 lb)   SpO2 98%   BMI 22.73 kg/m²       Physical Exam  Constitutional:       Appearance: Normal appearance. The patient is well-developed and normal weight.   HENT:      Head: Normocephalic.      Right Ear: Hearing and external ear normal.      Left Ear: Hearing and external ear normal.      Nose: Nose normal.   Eyes:      Conjunctiva/sclera: Conjunctivae normal.   Cardiovascular:      Rate and Rhythm: Normal rate.   Pulmonary:      Effort: Pulmonary effort is normal.      Breath sounds: No wheezing or rales.   Abdominal:      Palpations: Abdomen is soft.      Tenderness: There is no abdominal tenderness.   Musculoskeletal:      Cervical back: Normal range of motion.   Skin:     Findings: No rash.   Neurological:      Mental Status: The patient is alert and oriented to person, place, and time.   Psychiatric:         Mood and Affect: Mood and affect normal.         " Judgment: Judgment normal.       Ortho Exam      Right shoulder- Right shoulder- well healed scar to acromioclavicular joint. Non-tender. Neurovascularly intact. Sensation to light touch median, radial, ulnar nerve. Positive AIN, PIN, ulnar nerve. Positive pulses. Forward elevation 170. Abduction 140. External Rotation 85. Internal rotation 75. 4/5 supraspinatus strength. 5/5 infraspinatus  And subscapularis. Internal rotation to T-12. Negative lift off. Negative cross arm adduction and impingement testing. Negative O'yas     Procedures      Imaging Results (Most Recent)     None           Result Review :       No results found.           Assessment and Plan     Diagnoses and all orders for this visit:    1. Rotator cuff tendonitis, right (Primary)    2. Incomplete tear of right rotator cuff, unspecified whether traumatic    3. Cervical radicular pain        Discussed the treatment plan with the patient.  Plan to continue conservative treatment for the shoulder. Plan to continue physical therapy. Plan for MRI of the c-spine to evaluate the radiculopathy.     Call or return if worsening symptoms.    Follow Up     After MRI      Patient was given instructions and counseling regarding his condition or for health maintenance advice. Please see specific information pulled into the AVS if appropriate.     Scribed for Bethel Juan MD by Amber Calvo.  08/02/22   10:53 EDT    I have personally performed the services described in this document as scribed by the above individual and it is both accurate and complete. Bethel Juan MD 08/02/22

## 2022-08-23 ENCOUNTER — TELEPHONE (OUTPATIENT)
Dept: ORTHOPEDIC SURGERY | Facility: CLINIC | Age: 67
End: 2022-08-23

## 2022-08-23 DIAGNOSIS — M54.12 CERVICAL RADICULAR PAIN: Primary | ICD-10-CM

## 2022-08-23 NOTE — TELEPHONE ENCOUNTER
Caller: ALEJANDRA    Joshua call back number: [phone]    What form or medical record are you requesting: MRI ORDER FOR C-SPINE    Who is requesting this form or medical record from you: [facility]    How would you like to receive the form or medical records (pick-up, mail, fax): FAX  If fax, what is the fax number: [phone]

## 2022-09-14 DIAGNOSIS — M54.12 CERVICAL RADICULAR PAIN: ICD-10-CM

## 2022-12-21 ENCOUNTER — TRANSCRIBE ORDERS (OUTPATIENT)
Dept: ADMINISTRATIVE | Facility: HOSPITAL | Age: 67
End: 2022-12-21

## 2022-12-21 DIAGNOSIS — R13.19 ESOPHAGEAL DYSPHAGIA: Primary | ICD-10-CM

## 2023-08-24 ENCOUNTER — OFFICE VISIT (OUTPATIENT)
Dept: ORTHOPEDIC SURGERY | Facility: CLINIC | Age: 68
End: 2023-08-24
Payer: OTHER GOVERNMENT

## 2023-08-24 VITALS
OXYGEN SATURATION: 95 % | BODY MASS INDEX: 24.92 KG/M2 | DIASTOLIC BLOOD PRESSURE: 77 MMHG | HEIGHT: 71 IN | HEART RATE: 64 BPM | SYSTOLIC BLOOD PRESSURE: 129 MMHG | WEIGHT: 178 LBS

## 2023-08-24 DIAGNOSIS — M25.562 LEFT KNEE PAIN, UNSPECIFIED CHRONICITY: ICD-10-CM

## 2023-08-24 DIAGNOSIS — M25.561 RIGHT KNEE PAIN, UNSPECIFIED CHRONICITY: Primary | ICD-10-CM

## 2023-08-24 DIAGNOSIS — M17.12 PRIMARY OSTEOARTHRITIS OF LEFT KNEE: ICD-10-CM

## 2023-08-24 DIAGNOSIS — M17.11 PRIMARY OSTEOARTHRITIS OF RIGHT KNEE: ICD-10-CM

## 2023-08-24 DIAGNOSIS — M76.899 QUADRICEPS TENDONITIS: ICD-10-CM

## 2023-08-24 PROCEDURE — 99203 OFFICE O/P NEW LOW 30 MIN: CPT | Performed by: ORTHOPAEDIC SURGERY

## 2023-08-24 NOTE — PROGRESS NOTES
"Chief Complaint  Pain and Initial Evaluation of the Left Knee and Pain and Initial Evaluation of the Right Knee     Subjective      Steven Mahajan presents to [facility] for initial evaluation of bilateral knees. He had surgery on his knees in 2007 or 2008 and in 2014 for quad tendon ruptures for bilateral knees.  He cannot take NSAID's.  He uses ice for pain relief.  He needs to use bracing due to advanced degenerative arthritis in bilateral knees to maintain function in daily tasks, ADL's, prolonged standing, ambulation and stairs.       Allergies   Allergen Reactions    Cephalexin GI Intolerance    Sulfa Antibiotics Unknown - Low Severity        Social History     Socioeconomic History    Marital status:    Tobacco Use    Smoking status: Former     Packs/day: 1.00     Years: 15.00     Pack years: 15.00     Types: Cigarettes    Smokeless tobacco: Never    Tobacco comments:     quit 1989   Vaping Use    Vaping Use: Never used   Substance and Sexual Activity    Alcohol use: Yes     Comment: occasionally     Drug use: Never    Sexual activity: Defer        I reviewed the patient's chief complaint, history of present illness, review of systems, past medical history, surgical history, family history, social history, medications, and allergy list.     Review of Systems     Constitutional: Denies fevers, chills, weight loss  Cardiovascular: Denies chest pain, shortness of breath  Skin: Denies rashes, acute skin changes  Neurologic: Denies headache, loss of consciousness        Vital Signs:   /77   Pulse 64   Ht 180.3 cm (71\")   Wt 80.7 kg (178 lb)   SpO2 95%   BMI 24.83 kg/mý          Physical Exam  General: Alert. No acute distress    Ortho Exam        BILATERAL KNEES Flexion 115. Extension 0. Stable to varus/valgus stress. Stable to anterior/posterior drawer. Neurovascularly intact. Negative David. Negative Lachman. Positive EHL, FHL, HS and TA. Sensation intact to " light touch all 5 nerves of the foot. Ambulates with Antalgic gait. Patella is well tracking. Calf supple, non-tender. Positive tenderness to the medial joint line. Positive tenderness to the lateral joint line. Positive Crepitus. Good strength to hamstrings, quadriceps, dorsiflexors, and plantar flexors.  Knee Extensor Mechanism intact  The braces are to be worn as needed for support and stability for daily tasks.  The braces continue to benefit as a direct and active component of the patients medical treatment to continue daily activities and tasks with decrease pain and decrease risk for falls.  These braces are still medically necessary          Procedures      Imaging Results (Most Recent)       Procedure Component Value Units Date/Time    XR Knee 3 View Right [[id number]] Resulted: 08/25/23 0804     Updated: 08/25/23 0804    Narrative:       X-Ray Report:  Right knee X-Ray  Indication: Evaluation of the right knee.   AP/Lateral and [place] view(s)  Findings: Advanced degenerative arthritis.   Prior studies available for comparison: no          XR Knee 3 View Left [[id number]] Resulted: 08/25/23 0803     Updated: 08/25/23 0804    Narrative:      X-Ray Report:  Left knee X-Ray  Indication: Evaluation of the left knee  AP/Lateral and [place] view(s)  Findings: Advanced degenerative arthritis.   Prior studies available for comparison: no                Result Review :        X-Ray Report:  Right knee X-Ray  Indication: Evaluation of the right knee.   AP/Lateral and [place] view(s)  Findings: Advanced degenerative arthritis.   Prior studies available for comparison: no     X-Ray Report:  Left knee X-Ray  Indication: Evaluation of the left knee  AP/Lateral and [place] view(s)  Findings: Advanced degenerative arthritis.   Prior studies available for comparison: no        Assessment and Plan     Diagnoses and all orders for this visit:    1. Right knee pain, unspecified chronicity (Primary)  -     XR Knee 3 View  Right    2. Left knee pain, unspecified chronicity  -     XR Knee 3 View Left    3. Primary osteoarthritis of right knee    4. Primary osteoarthritis of left knee    5. Bilateral Quadriceps tendonitis        Discussed the treatment plan with the patient. I reviewed the X-rays that were obtained today with the patient.     Modify activity as needed.      Continue bracing. Ice and elevate as needed. Continue with topical creams as needed for pain relief.       Call or return if worsening symptoms.    Follow Up     PRN      Patient was given instructions and counseling regarding his condition or for health maintenance advice. Please see specific information pulled into the AVS if appropriate.     Scribed for Tyson Chairez MD by Maryam Crump MA.  08/24/23   15:03 EDT    I have personally performed the services described in this document as scribed by the above individual and it is both accurate and complete. Tyson Chairez MD 08/29/23

## 2023-09-13 ENCOUNTER — TELEPHONE (OUTPATIENT)
Dept: ORTHOPEDIC SURGERY | Facility: CLINIC | Age: 68
End: 2023-09-13
Payer: OTHER GOVERNMENT

## 2023-09-15 ENCOUNTER — OFFICE VISIT (OUTPATIENT)
Dept: ORTHOPEDIC SURGERY | Facility: CLINIC | Age: 68
End: 2023-09-15
Payer: OTHER GOVERNMENT

## 2023-09-15 VITALS
WEIGHT: 181.4 LBS | OXYGEN SATURATION: 96 % | BODY MASS INDEX: 25.97 KG/M2 | HEART RATE: 73 BPM | SYSTOLIC BLOOD PRESSURE: 121 MMHG | DIASTOLIC BLOOD PRESSURE: 72 MMHG | HEIGHT: 70 IN

## 2023-09-15 DIAGNOSIS — M17.12 PRIMARY OSTEOARTHRITIS OF LEFT KNEE: ICD-10-CM

## 2023-09-15 DIAGNOSIS — M17.11 PRIMARY OSTEOARTHRITIS OF RIGHT KNEE: ICD-10-CM

## 2023-09-15 DIAGNOSIS — M25.562 LEFT KNEE PAIN, UNSPECIFIED CHRONICITY: Primary | ICD-10-CM

## 2023-09-15 DIAGNOSIS — M25.561 RIGHT KNEE PAIN, UNSPECIFIED CHRONICITY: ICD-10-CM

## 2023-09-15 NOTE — PROGRESS NOTES
"Chief Complaint  Pain of the Left Knee and Pain of the Right Knee     Subjective      Steven Mahajan presents to [facility] for follow up of bilateral knees.  He had surgery on his knees in 2007 or 2008 and in 2014 for quad tendon ruptures for bilateral knees.  He cannot take NSAID's.  He uses ice for pain relief.  He needs to use bracing due to advanced degenerative arthritis in bilateral knees to maintain function in daily tasks, ADL's, prolonged standing, ambulation and stairs.   He had X rays of bilateral knees on 8/25/23 that showed advanced degenerative arthritis. His left knee is worse then the right and he is having problems with stairs.  He states the left knee is 10/10 and the right knee is 6 1/2 /10. He is taping his knees to give some relief.     Allergies   Allergen Reactions    Cephalexin GI Intolerance    Sulfa Antibiotics Unknown - Low Severity        Social History     Socioeconomic History    Marital status:    Tobacco Use    Smoking status: Former     Packs/day: 1.00     Years: 15.00     Pack years: 15.00     Types: Cigarettes    Smokeless tobacco: Never    Tobacco comments:     quit 1989   Vaping Use    Vaping Use: Never used   Substance and Sexual Activity    Alcohol use: Yes     Comment: occasionally     Drug use: Never    Sexual activity: Defer        I reviewed the patient's chief complaint, history of present illness, review of systems, past medical history, surgical history, family history, social history, medications, and allergy list.     Review of Systems     Constitutional: Denies fevers, chills, weight loss  Cardiovascular: Denies chest pain, shortness of breath  Skin: Denies rashes, acute skin changes  Neurologic: Denies headache, loss of consciousness      Vital Signs:   /72   Pulse 73   Ht 177.8 cm (70\")   Wt 82.3 kg (181 lb 6.4 oz)   SpO2 96%   BMI 26.03 kg/m²          Physical Exam  General: Alert. No acute distress    Ortho Exam  "       BILATERAL KNEES Flexion 115. Extension 0. Stable to varus/valgus stress. Stable to anterior/posterior drawer. Neurovascularly intact. Negative David. Negative Lachman. Positive EHL, FHL, HS and TA. Sensation intact to light touch all 5 nerves of the foot. Ambulates with Antalgic gait. Patella is well tracking. Calf supple, non-tender. Positive tenderness to the medial joint line. Positive tenderness to the lateral joint line. Positive Crepitus. Good strength to hamstrings, quadriceps, dorsiflexors, and plantar flexors.  Knee Extensor Mechanism intact        Large Joint Arthrocentesis: R knee  Date/Time: 9/15/2023 9:00 AM  Consent given by: patient  Site marked: site marked  Timeout: Immediately prior to procedure a time out was called to verify the correct patient, procedure, equipment, support staff and site/side marked as required   Supporting Documentation  Indications: pain   Procedure Details  Location: knee - R knee  Preparation: Patient was prepped and draped in the usual sterile fashion  Needle gauge: 21g.  Medications administered: 48 mg hylan 48 MG/6ML  Patient tolerance: patient tolerated the procedure well with no immediate complications      Large Joint Arthrocentesis: L knee  Date/Time: 9/15/2023 9:01 AM  Consent given by: patient  Site marked: site marked  Timeout: Immediately prior to procedure a time out was called to verify the correct patient, procedure, equipment, support staff and site/side marked as required   Supporting Documentation  Indications: pain   Procedure Details  Location: knee - L knee  Preparation: Patient was prepped and draped in the usual sterile fashion  Needle gauge: 21g.  Medications administered: 48 mg hylan 48 MG/6ML  Patient tolerance: patient tolerated the procedure well with no immediate complications        Imaging Results (Most Recent)       None             Result Review :         XR Knee 3 View Left    Result Date: 8/25/2023  Narrative: X-Ray Report: Left  knee X-Ray Indication: Evaluation of the left knee AP/Lateral and [place] view(s) Findings: Advanced degenerative arthritis. Prior studies available for comparison: no     XR Knee 3 View Right    Result Date: 8/25/2023  Narrative:  X-Ray Report: Right knee X-Ray Indication: Evaluation of the right knee. AP/Lateral and [place] view(s) Findings: Advanced degenerative arthritis. Prior studies available for comparison: no              Assessment and Plan     Diagnoses and all orders for this visit:    1. Left knee pain, unspecified chronicity (Primary)    2. Right knee pain, unspecified chronicity    3. Primary osteoarthritis of right knee    4. Primary osteoarthritis of left knee        Discussed the treatment plan with the patient. I reviewed the X-rays that were obtained 8/25/23 with the patient.       Discussed the treatment options with the patient, operative vs non-operative.     The patient expressed understanding and wished to proceed with conservative measures.      Discussed the risks and benefits of conservative measures.  The patient expressed understanding and wished to proceed with a bilateral knee Synvisc injection.  He tolerated the injection well.         Call or return if worsening symptoms.    Follow Up     PRN        Patient was given instructions and counseling regarding his condition or for health maintenance advice. Please see specific information pulled into the AVS if appropriate.     Scribed for Tyson Chairez MD by Maryam Crump MA.  09/15/23   08:36 EDT    I have personally performed the services described in this document as scribed by the above individual and it is both accurate and complete. Tyson Chairez MD 09/15/23

## 2023-09-19 ENCOUNTER — OFFICE VISIT (OUTPATIENT)
Dept: ORTHOPEDIC SURGERY | Facility: CLINIC | Age: 68
End: 2023-09-19
Payer: MEDICARE

## 2023-09-19 VITALS
WEIGHT: 186.8 LBS | OXYGEN SATURATION: 96 % | SYSTOLIC BLOOD PRESSURE: 125 MMHG | HEIGHT: 71 IN | BODY MASS INDEX: 26.15 KG/M2 | HEART RATE: 62 BPM | DIASTOLIC BLOOD PRESSURE: 86 MMHG

## 2023-09-19 DIAGNOSIS — M25.511 RIGHT SHOULDER PAIN, UNSPECIFIED CHRONICITY: ICD-10-CM

## 2023-09-19 DIAGNOSIS — M75.80 ROTATOR CUFF TENDONITIS, UNSPECIFIED LATERALITY: ICD-10-CM

## 2023-09-19 DIAGNOSIS — M25.512 LEFT SHOULDER PAIN, UNSPECIFIED CHRONICITY: Primary | ICD-10-CM

## 2023-09-19 RX ORDER — TRIAMCINOLONE ACETONIDE 40 MG/ML
40 INJECTION, SUSPENSION INTRA-ARTICULAR; INTRAMUSCULAR
Status: COMPLETED | OUTPATIENT
Start: 2023-09-19 | End: 2023-09-19

## 2023-09-19 RX ORDER — LIDOCAINE HYDROCHLORIDE 10 MG/ML
5 INJECTION, SOLUTION INFILTRATION; PERINEURAL
Status: COMPLETED | OUTPATIENT
Start: 2023-09-19 | End: 2023-09-19

## 2023-09-19 RX ADMIN — LIDOCAINE HYDROCHLORIDE 5 ML: 10 INJECTION, SOLUTION INFILTRATION; PERINEURAL at 09:00

## 2023-09-19 RX ADMIN — TRIAMCINOLONE ACETONIDE 40 MG: 40 INJECTION, SUSPENSION INTRA-ARTICULAR; INTRAMUSCULAR at 09:00

## 2023-09-19 RX ADMIN — TRIAMCINOLONE ACETONIDE 40 MG: 40 INJECTION, SUSPENSION INTRA-ARTICULAR; INTRAMUSCULAR at 09:01

## 2023-09-19 RX ADMIN — LIDOCAINE HYDROCHLORIDE 5 ML: 10 INJECTION, SOLUTION INFILTRATION; PERINEURAL at 09:01

## 2023-09-19 NOTE — PROGRESS NOTES
"Chief Complaint  Initial Evaluation of the Right Shoulder and Initial Evaluation of the Left Shoulder     Subjective      Steven Mahajan presents to [facility] for evaluation of the bilateral shoulders. He has previously had bilateral shoulder scopes. He reports he thinks he has a re-tear of his left rotator cuff. He reports a burning sensation to the left shoulder. He has a partial rotator cuff tear on the right side that we seen him for 1 year ago. He also had cervical radiculopathy at that time.     Allergies   Allergen Reactions    Cephalexin GI Intolerance    Sulfa Antibiotics Unknown - Low Severity        Social History     Socioeconomic History    Marital status:    Tobacco Use    Smoking status: Former     Packs/day: 1.00     Years: 15.00     Pack years: 15.00     Types: Cigarettes    Smokeless tobacco: Never    Tobacco comments:     quit 1989   Vaping Use    Vaping Use: Never used   Substance and Sexual Activity    Alcohol use: Yes     Comment: occasionally     Drug use: Never    Sexual activity: Defer        I reviewed the patient's chief complaint, history of present illness, review of systems, past medical history, surgical history, family history, social history, medications, and allergy list.     Review of Systems     Constitutional: Denies fevers, chills, weight loss  Cardiovascular: Denies chest pain, shortness of breath  Skin: Denies rashes, acute skin changes  Neurologic: Denies headache, loss of consciousness  MSK: bilateral shoulder pain      Vital Signs:   /86   Pulse 62   Ht 179.1 cm (70.5\")   Wt 84.7 kg (186 lb 12.8 oz)   SpO2 96%   BMI 26.42 kg/m²          Physical Exam  General: Alert. No acute distress    Ortho Exam      Right shoulder- Forward elevation 170. Abduction 145. External Rotation 85. Internal rotation 70. Sensation to light touch median, radial, ulnar nerve. Positive AIN, PIN, ulnar nerve motor function. Positive pulses. 5/5 " rotator cuff testing. Internal rotation to L-1. Negative lift off. Negative impingement. Negative cross arm adduction     Left shoulder- Forward elevation 175. Abduction 130. External Rotation 80. Internal rotation 70. Sensation to light touch median, radial, ulnar nerve. Positive AIN, PIN, ulnar nerve motor function. Positive pulses. 5/5 rotator cuff testing. Internal rotation to T-12. Negative lift off. Pain with impingement testing. Negative cross arm adduction     Right shoulder  Date/Time: 9/19/2023 9:00 AM  Consent given by: patient  Site marked: site marked  Timeout: Immediately prior to procedure a time out was called to verify the correct patient, procedure, equipment, support staff and site/side marked as required   Supporting Documentation  Indications: pain   Procedure Details  Location: shoulder (Right shoulder) -   Needle gauge: 21G.  Medications administered: 5 mL lidocaine 1 %; 40 mg triamcinolone acetonide 40 MG/ML  Patient tolerance: patient tolerated the procedure well with no immediate complications      Left shoulder  Date/Time: 9/19/2023 9:01 AM  Consent given by: patient  Site marked: site marked  Timeout: Immediately prior to procedure a time out was called to verify the correct patient, procedure, equipment, support staff and site/side marked as required   Supporting Documentation  Indications: pain   Procedure Details  Location: shoulder (Left shoulder) -   Needle gauge: 21G.  Medications administered: 5 mL lidocaine 1 %; 40 mg triamcinolone acetonide 40 MG/ML  Patient tolerance: patient tolerated the procedure well with no immediate complications        X-Ray Report:  Right scapula X-Ray  Indication: Evaluation of right shoulder pain  AP/Lateral view(s)  Findings: no acute fracture. Postoperative changes of DCE. Mild degenerative changes of glenohumeral joint.   Prior studies available for comparison: no     X-Ray Report:  Left scapula X-Ray  Indication: Evaluation of left shoulder  pain  AP/Lateral view(s)  Findings: postoperative changes of DCE and rotator cuff repair with metallic anchor to to the proximal humerus   Prior studies available for comparison: no         Imaging Results (Most Recent)       Procedure Component Value Units Date/Time    XR Scapula Right [[id number]] Resulted: 09/19/23 0836     Updated: 09/19/23 0840    XR Scapula Left [[id number]] Resulted: 09/19/23 0836     Updated: 09/19/23 0840             Result Review :       XR Knee 3 View Left    Result Date: 8/25/2023  Narrative: X-Ray Report: Left knee X-Ray Indication: Evaluation of the left knee AP/Lateral and [place] view(s) Findings: Advanced degenerative arthritis. Prior studies available for comparison: no     XR Knee 3 View Right    Result Date: 8/25/2023  Narrative:  X-Ray Report: Right knee X-Ray Indication: Evaluation of the right knee. AP/Lateral and [place] view(s) Findings: Advanced degenerative arthritis. Prior studies available for comparison: no              Assessment and Plan     Diagnoses and all orders for this visit:    1. Left shoulder pain, unspecified chronicity (Primary)  -     XR Scapula Left    2. Right shoulder pain, unspecified chronicity  -     XR Scapula Right    3. Rotator cuff tendonitis, bilateral        Discussed the treatment plan with the patient.  I reviewed the x-rays that were obtained today with the patient. Discussed the risks and benefits of bilateral shoulder MRI with the patient. Discussed the risks and benefits of bilateral shoulder steroid injections. The patient expressed understanding and wished to proceed. He tolerated the injections well.     Call or return if worsening symptoms.    Follow Up     MRI results      Patient was given instructions and counseling regarding his condition or for health maintenance advice. Please see specific information pulled into the AVS if appropriate.     Scribed for Bethel Juan MD by Amber Calvo.  09/19/23   08:37 EDT    I  have personally performed the services described in this document as scribed by the above individual and it is both accurate and complete. Bethel Juan MD 09/19/23

## 2023-09-28 DIAGNOSIS — M25.511 RIGHT SHOULDER PAIN, UNSPECIFIED CHRONICITY: ICD-10-CM

## 2023-09-28 DIAGNOSIS — M75.80 ROTATOR CUFF TENDONITIS, UNSPECIFIED LATERALITY: ICD-10-CM

## 2023-09-28 DIAGNOSIS — M25.512 LEFT SHOULDER PAIN, UNSPECIFIED CHRONICITY: ICD-10-CM

## 2023-11-17 ENCOUNTER — TELEPHONE (OUTPATIENT)
Dept: ORTHOPEDIC SURGERY | Facility: CLINIC | Age: 68
End: 2023-11-17
Payer: OTHER GOVERNMENT

## 2023-11-17 NOTE — TELEPHONE ENCOUNTER
"    Caller: Steven Mahajan \"Les\"    Relationship to patient: Self    Best call back number: [phone] (home)       Chief complaint: LEFT KNEE ACTING UP    Type of visit: INJECTION    Requested date: ANY DAY IS GOOD. WEDNESDAY HIS SCHEDULE IS TIGHT BUT HE CAN DO IT IF NECCESSARY     If rescheduling, when is the original appointment: NA     Additional notes:PATIENT IS INTERESTED IN GETTING INJECTION (HYLAN). LAST INJECTION WAS DONE 09/15/2023 BILATERAL KNEES BY DR KRAFT.     PLEASE CALL [phone] PATIENT GIVES PERMISSION FOR YOU TO LEAVE A DETAILED MESSAGE.            "

## 2024-02-23 ENCOUNTER — OFFICE VISIT (OUTPATIENT)
Dept: ORTHOPEDIC SURGERY | Facility: CLINIC | Age: 69
End: 2024-02-23
Payer: MEDICARE

## 2024-02-23 VITALS — WEIGHT: 188 LBS | HEIGHT: 71 IN | BODY MASS INDEX: 26.32 KG/M2

## 2024-02-23 DIAGNOSIS — M75.51 ACUTE BURSITIS OF RIGHT SHOULDER: ICD-10-CM

## 2024-02-23 DIAGNOSIS — M75.102 TEAR OF LEFT ROTATOR CUFF, UNSPECIFIED TEAR EXTENT, UNSPECIFIED WHETHER TRAUMATIC: Primary | ICD-10-CM

## 2024-02-23 DIAGNOSIS — M75.20 BICEPS TENDINITIS, UNSPECIFIED LATERALITY: ICD-10-CM

## 2024-02-23 DIAGNOSIS — M75.82 ROTATOR CUFF TENDONITIS, LEFT: ICD-10-CM

## 2024-02-23 DIAGNOSIS — M75.81 ROTATOR CUFF TENDONITIS, RIGHT: ICD-10-CM

## 2024-02-23 DIAGNOSIS — M75.101 TEAR OF RIGHT ROTATOR CUFF, UNSPECIFIED TEAR EXTENT, UNSPECIFIED WHETHER TRAUMATIC: ICD-10-CM

## 2024-02-23 NOTE — PROGRESS NOTES
"Chief Complaint  Follow-up of the Right Shoulder and Follow-up of the Left Shoulder     Subjective      Steven Mahajan presents to [facility] for follow up evaluation of the bilateral shoulders. He has been treating his bilateral shoulder tendonitis conservatively. He had injections in September. He has been taping his shoulders. He has no other complaints.     Allergies   Allergen Reactions    Cephalexin GI Intolerance    Sulfa Antibiotics Unknown - Low Severity        Social History     Socioeconomic History    Marital status:    Tobacco Use    Smoking status: Former     Packs/day: 1.00     Years: 15.00     Additional pack years: 0.00     Total pack years: 15.00     Types: Cigarettes    Smokeless tobacco: Never    Tobacco comments:     quit 1989   Vaping Use    Vaping Use: Never used   Substance and Sexual Activity    Alcohol use: Yes     Comment: occasionally     Drug use: Never    Sexual activity: Defer        I reviewed the patient's chief complaint, history of present illness, review of systems, past medical history, surgical history, family history, social history, medications, and allergy list.     Review of Systems     Constitutional: Denies fevers, chills, weight loss  Cardiovascular: Denies chest pain, shortness of breath  Skin: Denies rashes, acute skin changes  Neurologic: Denies headache, loss of consciousness  MSK: bilateral shoulder pain      Vital Signs:   Ht 179.1 cm (70.5\")   Wt 85.3 kg (188 lb)   BMI 26.59 kg/m²          Physical Exam  General: Alert. No acute distress    Ortho Exam      Right shoulder- Forward elevation 170. Abduction 145. External Rotation 85. Internal rotation 70. Sensation to light touch median, radial, ulnar nerve. Positive AIN, PIN, ulnar nerve motor function. Positive pulses. 5/5 rotator cuff testing. Internal rotation to L-1. Negative lift off. Negative impingement. Negative cross arm adduction      Left shoulder- Forward elevation " 175. Abduction 130. External Rotation 80. Internal rotation 70. Sensation to light touch median, radial, ulnar nerve. Positive AIN, PIN, ulnar nerve motor function. Positive pulses. 5/5 rotator cuff testing. Internal rotation to T-12. Negative lift off. Pain with impingement testing. Negative cross arm adduction       Procedures    [place] MRI Left shoulder- 1. Posterior changes and susceptibility artifact in the humeral head. 2. Thinning and signal abnormality in the distal supraspinatus  tendon, suggesting partial tearing distally, relatively high-grade. No definitive evidence of complete disruption. 3. Tendinopathy in  the distal infraspinatus tendon and in the subscapularis muscle and tendon. 4. Degenerative changes in the acromioclavicular joint.  5. Limited evaluation of the labrum. The labrum appears grossly intact. 6. Intra-articular long head biceps tendinopathy/tendinitis    [place] MRI Right shoulder- IMPRESSION:  1. Tendinopathy/tendinitis in the supraspinatus tendon mild partial interstitial tearing distally at the footprint which is similar to the  prior exam. 2. Tendinopathy/tendinitis and partial interstitial tearing of the distal infraspinatus tendon which appears increased in  extent from the prior exam. No evidence of disruption. 3. Mild subacromial bursitis appears increased from the prior exam. 4. The  glenoid labrum appears grossly intact. 5. Stable postsurgical changes in the acromion and distal clavicle      Imaging Results (Most Recent)       None             Result Review :       No results found.           Assessment and Plan     Diagnoses and all orders for this visit:    1. Tear of left rotator cuff, unspecified tear extent, unspecified whether traumatic (Primary)    2. Rotator cuff tendonitis, left    3. Biceps tendinitis, unspecified laterality    4. Tear of right rotator cuff, unspecified tear extent, unspecified whether traumatic    5. Rotator cuff tendonitis, right    6. Acute  bursitis of right shoulder        Discussed the treatment plan with the patient.  I reviewed the MRI with the patient. Plan to continue conservative treatment at this time. Home exercises given today. Discussed the risks and benefits of a bilateral shoulder steroid injections. The patient expressed understanding and wished to wait at this time.     Call or return if worsening symptoms.    Follow Up     PRN      Patient was given instructions and counseling regarding his condition or for health maintenance advice. Please see specific information pulled into the AVS if appropriate.     Scribed for Bethel Juan MD by Amber Calvo.  02/23/24   09:24 EST    I have personally performed the services described in this document as scribed by the above individual and it is both accurate and complete. Bethel Juan MD 02/24/24

## 2024-03-05 ENCOUNTER — OFFICE VISIT (OUTPATIENT)
Dept: UROLOGY | Facility: CLINIC | Age: 69
End: 2024-03-05
Payer: MEDICARE

## 2024-03-05 VITALS
BODY MASS INDEX: 25.9 KG/M2 | WEIGHT: 185 LBS | HEIGHT: 71 IN | DIASTOLIC BLOOD PRESSURE: 65 MMHG | HEART RATE: 69 BPM | SYSTOLIC BLOOD PRESSURE: 121 MMHG

## 2024-03-05 DIAGNOSIS — R97.20 ELEVATED PROSTATE SPECIFIC ANTIGEN (PSA): ICD-10-CM

## 2024-03-05 DIAGNOSIS — N40.1 BPH WITH OBSTRUCTION/LOWER URINARY TRACT SYMPTOMS: Primary | ICD-10-CM

## 2024-03-05 DIAGNOSIS — N13.8 BPH WITH OBSTRUCTION/LOWER URINARY TRACT SYMPTOMS: Primary | ICD-10-CM

## 2024-03-05 LAB
BILIRUB BLD-MCNC: NEGATIVE MG/DL
CLARITY, POC: CLEAR
COLOR UR: YELLOW
EXPIRATION DATE: ABNORMAL
GLUCOSE UR STRIP-MCNC: NEGATIVE MG/DL
KETONES UR QL: NEGATIVE
LEUKOCYTE EST, POC: NEGATIVE
Lab: ABNORMAL
NITRITE UR-MCNC: NEGATIVE MG/ML
PH UR: 6 [PH] (ref 5–8)
PROT UR STRIP-MCNC: ABNORMAL MG/DL
RBC # UR STRIP: NEGATIVE /UL
SP GR UR: 1.02 (ref 1–1.03)
UROBILINOGEN UR QL: NORMAL

## 2024-03-05 RX ORDER — DOXEPIN 3 MG/1
TABLET, FILM COATED ORAL
COMMUNITY
Start: 2024-02-09

## 2024-03-05 RX ORDER — OXYCODONE HCL 10 MG/1
TABLET, FILM COATED, EXTENDED RELEASE ORAL EVERY 12 HOURS SCHEDULED
COMMUNITY

## 2024-03-05 RX ORDER — DOXEPIN 6 MG/1
TABLET, FILM COATED ORAL
COMMUNITY
Start: 2024-02-21

## 2024-03-05 RX ORDER — LORATADINE 10 MG/1
TABLET ORAL
COMMUNITY
Start: 2023-12-10

## 2024-03-05 RX ORDER — CETIRIZINE HYDROCHLORIDE 10 MG/1
1 TABLET ORAL DAILY
COMMUNITY

## 2024-03-05 NOTE — PROGRESS NOTES
"Chief Complaint  Elevated PSA (F/U PT. BROUGHT LABS WITH)        PSA-V is 3.8 upper border of normal is 3.6   Subjective no acute distress        Steven Mahajan presents to [facility]  History of Present Illness    68-year-old -American male has slight elevation of PSA.  Patient has occasional urgency incontinence.  Nocturia twice and overall AUA score is 5/35.  Quality score is 5.  Patient has no dysuria or gross hematuria.  No history of prostate cancer in the family.    MRI of prostate done in 2021 did not reveal any significant prostate cancer.    Objective no acute distress  Vital Signs:   /65 (BP Location: Left arm, Patient Position: Sitting, Cuff Size: Large Adult)   Pulse 69   Ht 179.1 cm (70.5\")   Wt 83.9 kg (185 lb)   BMI 26.17 kg/m²     Allergies   Allergen Reactions    Cephalexin GI Intolerance    Sulfa Antibiotics Unknown - Low Severity      Past medical history:  has a past medical history of Allergies, Disease of thyroid gland, Elevated PSA, Erectile dysfunction, and Hypertension.   Past surgical history:  has a past surgical history that includes Colonoscopy; Hernia repair; knee arthroscopy with patella femoral ligament reconstruction; Rotator cuff repair; and Colonoscopy (N/A, 12/14/2021).  Personal history: family history includes Colon cancer in his mother.  Social history:  reports that he has quit smoking. His smoking use included cigarettes. He has a 15 pack-year smoking history. He has never used smokeless tobacco. He reports current alcohol use. He reports that he does not use drugs.    Review of Systems    Please see past medical and surgical history and rest of the system is negative    Physical Exam  Constitutional:       General: He is not in acute distress.     Appearance: Normal appearance. He is normal weight. He is not ill-appearing or toxic-appearing.   HENT:      Head: Normocephalic and atraumatic.      Ears:      Comments: No loss of " hearing  Cardiovascular:      Rate and Rhythm: Normal rate and regular rhythm.      Pulses: Normal pulses.      Heart sounds: Normal heart sounds. No murmur heard.  Pulmonary:      Effort: Pulmonary effort is normal. No respiratory distress.      Breath sounds: Normal breath sounds. No rhonchi or rales.   Abdominal:      Palpations: Abdomen is soft. There is no mass.      Tenderness: There is no abdominal tenderness. There is no right CVA tenderness, left CVA tenderness or guarding.   Genitourinary:     Comments: Uncircumcised normal penis.        Right and left scrotum is normal.    Right left testicles are smaller.  They are round 4 cm and 3.5 cm.    SUJIT.  Prostate gland is just about 40 g and benign.  I do not feel any hard areas  Musculoskeletal:         General: No swelling. Normal range of motion.      Cervical back: Normal range of motion and neck supple. No rigidity or tenderness.   Lymphadenopathy:      Cervical: No cervical adenopathy.   Skin:     General: Skin is warm.      Coloration: Skin is not jaundiced.   Neurological:      General: No focal deficit present.      Mental Status: He is alert and oriented to person, place, and time.      Motor: No weakness.      Gait: Gait normal.   Psychiatric:         Mood and Affect: Mood normal.         Behavior: Behavior normal.         Thought Content: Thought content normal.         Judgment: Judgment normal.        Result Review :                 Assessment and Plan    Diagnoses and all orders for this visit:    1. BPH with obstruction/lower urinary tract symptoms (Primary)  -     POC Urinalysis Dipstick, Automated    2. Elevated prostate specific antigen (PSA)      I will go and order MRI of prostate.  Will recheck him in 6 months time but I will let him know about the findings of his MRI before that.  Brief Urine Lab Results  (Last result in the past 365 days)        Color   Clarity   Blood   Leuk Est   Nitrite   Protein   CREAT   Urine HCG        03/05/24  1140 Yellow   Clear   Negative   Negative   Negative   Trace                    Follow Up   No follow-ups on file.  Patient was given instructions and counseling regarding his condition or for health maintenance advice. Please see specific information pulled into the AVS if appropriate.     Jamie Ramírez MD

## 2024-03-14 ENCOUNTER — TELEPHONE (OUTPATIENT)
Dept: UROLOGY | Facility: CLINIC | Age: 69
End: 2024-03-14

## 2024-03-14 NOTE — TELEPHONE ENCOUNTER
Caller: LINDSEY LEXIS    Relationship: SELF    Best call back number: [phone] (home)     What is the best time to reach you: ANY    Who are you requesting to speak with (clinical staff, provider,  specific staff member): DR CHACKO OR STAFF    Do you know the name of the person who called: PT CALLED OFFICE    What was the call regarding: PT IS CALLING REGARDING IMAGING IN APRIL. PT STATED WAS TOLD HE HAD A GASX PILL PRIOR AND WAS TO GET IT FROM PROVIDERS OFFICE. PLEASE CALL PT BACK TO DISCUSS. THANK YOU.

## 2024-03-18 ENCOUNTER — TELEPHONE (OUTPATIENT)
Dept: UROLOGY | Facility: CLINIC | Age: 69
End: 2024-03-18
Payer: MEDICARE

## 2024-03-18 NOTE — TELEPHONE ENCOUNTER
Patient has an appt coming up for MRI, he stated that he was told he could get a GasX pill to take, if you could please send in

## 2024-03-25 ENCOUNTER — HOSPITAL ENCOUNTER (OUTPATIENT)
Dept: OTHER | Facility: HOSPITAL | Age: 69
Discharge: HOME | End: 2024-03-25

## 2024-05-21 ENCOUNTER — OFFICE VISIT (OUTPATIENT)
Dept: ORTHOPEDIC SURGERY | Facility: CLINIC | Age: 69
End: 2024-05-21
Payer: MEDICARE

## 2024-05-21 VITALS
WEIGHT: 185 LBS | SYSTOLIC BLOOD PRESSURE: 133 MMHG | BODY MASS INDEX: 25.9 KG/M2 | HEIGHT: 71 IN | DIASTOLIC BLOOD PRESSURE: 74 MMHG | HEART RATE: 62 BPM | OXYGEN SATURATION: 95 %

## 2024-05-21 DIAGNOSIS — M25.562 LEFT KNEE PAIN, UNSPECIFIED CHRONICITY: Primary | ICD-10-CM

## 2024-05-21 DIAGNOSIS — M17.12 OSTEOARTHRITIS OF LEFT KNEE, UNSPECIFIED OSTEOARTHRITIS TYPE: ICD-10-CM

## 2024-05-21 NOTE — PROGRESS NOTES
"Chief Complaint  Follow-up of the Left Knee     Subjective      Steven Mahajan presents to [facility] for follow up of the left knee.  He had surgery on his knees in 2007 or 2008 and in 2014 for quad tendon ruptures for bilateral knees.  He cannot take NSAID's.  He uses ice for pain relief.  He needs to use bracing due to advanced degenerative arthritis in bilateral knees to maintain function in daily tasks, ADL's, prolonged standing, ambulation and stairs.   He has been in physical therapy.  He has pain on the lateral aspect of the left knee.  He has mild swelling on the outside of the knee.     Allergies   Allergen Reactions    Cephalexin GI Intolerance    Sulfa Antibiotics Unknown - Low Severity        Social History     Socioeconomic History    Marital status:    Tobacco Use    Smoking status: Former     Current packs/day: 1.00     Average packs/day: 1 pack/day for 15.0 years (15.0 ttl pk-yrs)     Types: Cigarettes    Smokeless tobacco: Never    Tobacco comments:     quit 1989   Vaping Use    Vaping status: Never Used   Substance and Sexual Activity    Alcohol use: Yes     Comment: occasionally     Drug use: Never    Sexual activity: Defer        I reviewed the patient's chief complaint, history of present illness, review of systems, past medical history, surgical history, family history, social history, medications, and allergy list.     Review of Systems     Constitutional: Denies fevers, chills, weight loss  Cardiovascular: Denies chest pain, shortness of breath  Skin: Denies rashes, acute skin changes  Neurologic: Denies headache, loss of consciousness        Vital Signs:   /74   Pulse 62   Ht 179.1 cm (70.5\")   Wt 83.9 kg (185 lb)   SpO2 95%   BMI 26.17 kg/m²          Physical Exam  General: Alert. No acute distress    Ortho Exam        LEFT KNEE Flexion 120. Extension 0. Stable to varus/valgus stress. Stable to anterior/posterior drawer. Neurovascularly " intact. Negative David. Negative Lachman. Positive EHL, FHL, HS and TA. Sensation intact to light touch all 5 nerves of the foot. Ambulates with Non-antalgic gait. Patella is well tracking. Calf supple, non-tender. Positive tenderness to the medial joint line. Negative tenderness to the lateral joint line. Positive Crepitus. Good strength to hamstrings, quadriceps, dorsiflexors, and plantar flexors.  Knee Extensor Mechanism intact       Procedures      Imaging Results (Most Recent)       Procedure Component Value Units Date/Time    XR Knee 3 View Left [[id number]] Resulted: 05/21/24 1450     Updated: 05/21/24 1454             Result Review :     X-Ray Report:  Left knee X-Ray  Indication: Evaluation of the left knee  AP/Lateral and [place] view(s)  Findings: Moderate to severe degenerative arthritis.    Prior studies available for comparison: yes             Assessment and Plan     Diagnoses and all orders for this visit:    1. Left knee pain, unspecified chronicity (Primary)  -     XR Knee 3 View Left    2. Osteoarthritis of left knee, unspecified osteoarthritis type        Discussed the treatment plan with the patient. I reviewed the X-rays that were obtained today with the patient.     MRI of the left knee to assess the structure.     Call or return if worsening symptoms.    Follow Up     After MRI of the left knee.        Patient was given instructions and counseling regarding his condition or for health maintenance advice. Please see specific information pulled into the AVS if appropriate.     Scribed for Tyson Chairez MD by Maryam Crump MA.  05/21/24   15:02 EDT    I have personally performed the services described in this document as scribed by the above individual and it is both accurate and complete. Tyson Chairez MD 05/21/24

## 2024-05-22 ENCOUNTER — HOSPITAL ENCOUNTER (OUTPATIENT)
Facility: HOSPITAL | Age: 69
Discharge: HOME | End: 2024-05-22
Admitting: UROLOGY
Payer: MEDICARE

## 2024-05-22 DIAGNOSIS — N13.8 BPH WITH OBSTRUCTION/LOWER URINARY TRACT SYMPTOMS: ICD-10-CM

## 2024-05-22 DIAGNOSIS — R97.20 ELEVATED PROSTATE SPECIFIC ANTIGEN (PSA): ICD-10-CM

## 2024-05-22 DIAGNOSIS — N40.1 BPH WITH OBSTRUCTION/LOWER URINARY TRACT SYMPTOMS: ICD-10-CM

## 2024-05-22 PROCEDURE — 72197 MRI PELVIS W/O & W/DYE: CPT

## 2024-05-22 PROCEDURE — A9577 INJ MULTIHANCE: HCPCS | Performed by: UROLOGY

## 2024-05-22 PROCEDURE — 0 GADOBENATE DIMEGLUMINE 529 MG/ML SOLUTION: Performed by: UROLOGY

## 2024-05-22 RX ADMIN — GADOBENATE DIMEGLUMINE 16 ML: 529 INJECTION, SOLUTION INTRAVENOUS at 17:44

## 2024-06-03 ENCOUNTER — TELEPHONE (OUTPATIENT)
Dept: UROLOGY | Facility: CLINIC | Age: 69
End: 2024-06-03
Payer: MEDICARE

## 2024-06-03 NOTE — TELEPHONE ENCOUNTER
I called  Keyshawn and left a message and he will call me in the office later on.  Basically MRI of his prostate does not show any abnormal areas to worry about

## 2024-07-15 ENCOUNTER — HOSPITAL ENCOUNTER (OUTPATIENT)
Dept: MRI IMAGING | Facility: HOSPITAL | Age: 69
Discharge: HOME | End: 2024-07-15
Admitting: ORTHOPAEDIC SURGERY
Payer: OTHER GOVERNMENT

## 2024-07-15 DIAGNOSIS — M25.562 LEFT KNEE PAIN, UNSPECIFIED CHRONICITY: ICD-10-CM

## 2024-07-15 PROCEDURE — 73721 MRI JNT OF LWR EXTRE W/O DYE: CPT

## 2024-07-16 ENCOUNTER — TELEPHONE (OUTPATIENT)
Dept: ORTHOPEDIC SURGERY | Facility: CLINIC | Age: 69
End: 2024-07-16
Payer: MEDICARE

## 2024-08-13 ENCOUNTER — OFFICE VISIT (OUTPATIENT)
Dept: ORTHOPEDIC SURGERY | Facility: CLINIC | Age: 69
End: 2024-08-13
Payer: OTHER GOVERNMENT

## 2024-08-13 VITALS
HEART RATE: 68 BPM | BODY MASS INDEX: 24.5 KG/M2 | OXYGEN SATURATION: 96 % | WEIGHT: 175 LBS | HEIGHT: 71 IN | SYSTOLIC BLOOD PRESSURE: 135 MMHG | DIASTOLIC BLOOD PRESSURE: 75 MMHG

## 2024-08-13 DIAGNOSIS — M25.562 LEFT KNEE PAIN, UNSPECIFIED CHRONICITY: Primary | ICD-10-CM

## 2024-08-13 DIAGNOSIS — M17.12 OSTEOARTHRITIS OF LEFT KNEE, UNSPECIFIED OSTEOARTHRITIS TYPE: ICD-10-CM

## 2024-08-13 NOTE — PROGRESS NOTES
"Chief Complaint  Follow-up of the Left Knee     Subjective      Steven Mahajan presents to [facility] for follow up of the left knee. He had a MRI and is here to review.   He had surgery on his knees in 2007 or 2008 and in 2014 for quad tendon ruptures for bilateral knees.  He cannot take NSAID's.  He uses ice for pain relief.  He needs to use bracing due to advanced degenerative arthritis in bilateral knees to maintain function in daily tasks, ADL's, prolonged standing, ambulation and stairs.   He has been in physical therapy.  He has pain on the lateral aspect of the left knee.  He has mild swelling on the outside of the knee. He is starting to have the same symptoms in the right knee.     Allergies   Allergen Reactions    Cephalexin GI Intolerance    Sulfa Antibiotics Unknown - Low Severity        Social History     Socioeconomic History    Marital status:    Tobacco Use    Smoking status: Former     Current packs/day: 1.00     Average packs/day: 1 pack/day for 15.0 years (15.0 ttl pk-yrs)     Types: Cigarettes    Smokeless tobacco: Never    Tobacco comments:     quit 1989   Vaping Use    Vaping status: Never Used   Substance and Sexual Activity    Alcohol use: Yes     Comment: occasionally     Drug use: Never    Sexual activity: Defer        I reviewed the patient's chief complaint, history of present illness, review of systems, past medical history, surgical history, family history, social history, medications, and allergy list.     Review of Systems     Constitutional: Denies fevers, chills, weight loss  Cardiovascular: Denies chest pain, shortness of breath  Skin: Denies rashes, acute skin changes  Neurologic: Denies headache, loss of consciousness        Vital Signs:   /75   Pulse 68   Ht 179.1 cm (70.51\")   Wt 79.4 kg (175 lb)   SpO2 96%   BMI 24.75 kg/m²          Physical Exam  General: Alert. No acute distress    Ortho Exam        LEFT KNEE Flexion 120. " Extension 0. Stable to varus/valgus stress. Ambulates with Non-antalgic gait. Patella is well tracking. Calf supple, non-tender. Positive tenderness to the medial joint line. Negative tenderness to the lateral joint line. Positive Crepitus. Good strength to hamstrings, quadriceps, dorsiflexors, and plantar flexors.  Knee Extensor Mechanism intact          Procedures      Imaging Results (Most Recent)       None             Result Review :         MRI Knee Left Without Contrast    Result Date: 7/16/2024  Narrative: MRI KNEE LEFT  WO CONTRAST Date of Exam: 7/15/2024 8:25 AM EDT Indication: Lt knee pain.  Comparison: 3 view left knee dated 5/21/2024 Technique:  Routine multiplanar/multisequence images of the left knee were obtained without contrast administration. Findings: There is T2 high signal consistent with edema throughout the patella. Subchondral edema is also noted in the tibial plateau as well as the medial and lateral femoral condyles, likely arthritic in etiology. No fracture or malalignment is seen. Postsurgical changes are noted in the patella. There are foci of heterotopic ossification superior to the patella measuring up to 2.0 cm in size. There is moderate attenuation of the distal quadriceps tendon consistent with partial thickness tearing. There is a small full-thickness tear of the quadriceps tendon which measures 0.2 cm in thickness. There is thickening of the patellar tendon consistent with scarring. No acute fracture or malalignment is identified. There is a vertical tear of the medial meniscal posterior horn the medial meniscal body is diminutive. A small horizontal undersurface tear of the medial meniscal body is noted. There is a complex tear exiting the superior and inferior surfaces of the lateral meniscal body. There is complex tear of the lateral meniscal body exiting the undersurface. The cruciate ligaments, medial collateral ligament, lateral collateral ligament complex, patellar  retinacula appear intact. A small joint effusion is noted. Grade III-IV chondromalacia is noted along both the medial and lateral facets of the patella. Findings include full-thickness cartilage loss along the lateral patellar facet measuring up to 1.3 cm transverse. There is grade III chondromalacia throughout the medial compartment. There is grade II chondromalacia noted throughout the lateral compartment. Osteoarthritic spurring is noted. No loose body is seen. A 3.0 cm popliteal cyst is noted.     Impression: Impression: 1.Moderate to severe osteoarthritic changes, as above. 2.Small joint effusion. 3.Postsurgical changes of the patella possibly related to a previous quadriceps and/or patellar tendon repair. 4.Multiple foci of heterotopic ossification superior to the patella, as above. 5.Moderate tearing of the distal quadriceps tendon, as described above. 6.Multiple meniscal tears, as detailed above. 7.3.0 cm popliteal cyst Electronically Signed: Ross Urbina MD  7/16/2024 9:39 AM EDT  Workstation ID: [id number]            Assessment and Plan     Diagnoses and all orders for this visit:    1. Left knee pain, unspecified chronicity (Primary)    2. Osteoarthritis of left knee, unspecified osteoarthritis type        Discussed the treatment plan with the patient. I reviewed the MRI results with the patient.     Discussed the treatment options with the patient, operative vs non-operative. The patient is a candidate total knee arthroplasty of either knee.     Discussed surgery. and Call or return if worsening symptoms.    Follow Up     He will call back to schedule.        Patient was given instructions and counseling regarding his condition or for health maintenance advice. Please see specific information pulled into the AVS if appropriate.     Scribed for Tyson Chairez MD by Maryam Crump MA.  08/13/24   09:02 EDT    I have personally performed the services described in this document as scribed by the above  individual and it is both accurate and complete. Tyson Chairez MD 08/15/24

## 2024-08-15 ENCOUNTER — TELEPHONE (OUTPATIENT)
Dept: ORTHOPEDIC SURGERY | Facility: CLINIC | Age: 69
End: 2024-08-15
Payer: MEDICARE

## 2024-08-15 ENCOUNTER — TELEPHONE (OUTPATIENT)
Dept: GASTROENTEROLOGY | Facility: CLINIC | Age: 69
End: 2024-08-15
Payer: MEDICARE

## 2024-08-15 NOTE — TELEPHONE ENCOUNTER
Caller: JARRED    Relationship to patient: SELF    Best call back number: [phone]    Chief complaint: BILATERAL KNEE PAIN    Type of visit: GEL APPT 8/27/24- WOULD LIKE AN EARLIER DATE IF POSSIBLE-PLEASE CALL

## 2024-08-15 NOTE — TELEPHONE ENCOUNTER
I left pt a Vm to return call to schedule colon recall with Dr. Barcenas. Pt due 12/2024. I left the office number as a call back number.

## 2024-08-27 ENCOUNTER — OFFICE VISIT (OUTPATIENT)
Dept: ORTHOPEDIC SURGERY | Facility: CLINIC | Age: 69
End: 2024-08-27
Payer: MEDICARE

## 2024-08-27 VITALS — WEIGHT: 175 LBS | BODY MASS INDEX: 24.5 KG/M2 | HEIGHT: 71 IN

## 2024-08-27 DIAGNOSIS — M17.0 BILATERAL PRIMARY OSTEOARTHRITIS OF KNEE: Primary | ICD-10-CM

## 2024-08-27 NOTE — PATIENT INSTRUCTIONS
Discussed the risk, benefits and alternatives of injection.  Discussed potential complications such as increased pain, swelling and tenderness at the injection site.  Possibility of reaction.  Possibility that injection may not improve pain. Risk of infection.  Possibility of worsening pain after injection. Patient verbalized understanding and gives verbal consent to proceed.    Patient received Synvisc injection today in office into the bilateral knee and tolerated the procedure well without complication.  Counseled that these injections can be given every 6 months and 1 day with insurance approval. Pt can also receive steroid injections intermittently between these doses.  Steroid injections can be given every 3 months.    Follow up as needed for worsening symptoms or failure to improve. Call with questions, concerns, or worsening symptoms.

## 2024-08-27 NOTE — PROGRESS NOTES
"Chief Complaint  Follow-up of the Left Knee and Follow-up of the Right Knee    Subjective      Steven Mahajan presents to [facility] for follow-up of bilateral knee osteoarthritis.  Patient has history of bilateral quad tendon rupture repair in 2017 in 2014.  Patient is unable to take NSAIDs.  Patient has advanced degenerative arthritis in bilateral knees.  He has done physical therapy in the past and has had viscosupplementation.  He last received Visco on 8/13/2024.  He is here today to receive bilateral Synvisc injections.  He denies injury since his last appointment.    Objective   Allergies   Allergen Reactions    Cephalexin GI Intolerance    Sulfa Antibiotics Unknown - Low Severity       Vital Signs:   Ht 179.1 cm (70.51\")   Wt 79.4 kg (175 lb)   BMI 24.75 kg/m²       Physical Exam    Constitutional: Awake, alert. Well nourished appearance.    Integumentary: Warm, dry, intact. No obvious rashes.    HENT: Atraumatic, normocephalic.   Respiratory: Non labored respirations .   Cardiovascular: Intact peripheral pulses.    Psychiatric: Normal mood and affect. A&O X3    Ortho Exam  Bilateral knees: Range of motion 0 to 120 degrees.  Stable to varus and valgus stress.  Stable to anterior posterior drawer test.  No vascular intact.  Sensations intact.  Tender to palpation medial lateral joint line.  Surgical scars are completely healed and well-approximated.  Positive crepitus noted.  No significant effusion noted.    Imaging Results (Most Recent)       None             Large Joint: R knee  Date/Time: 8/27/2024 2:49 PM  Consent given by: patient  Site marked: site marked  Timeout: Immediately prior to procedure a time out was called to verify the correct patient, procedure, equipment, support staff and site/side marked as required   Supporting Documentation  Indications: pain   Procedure Details  Location: knee - R knee  Preparation: Patient was prepped and draped in the usual sterile " fashion  Needle gauge: 21 G.  Medications administered: 48 mg hylan 48 MG/6ML  Patient tolerance: patient tolerated the procedure well with no immediate complications      Large Joint: L knee  Date/Time: 8/27/2024 2:49 PM  Consent given by: patient  Site marked: site marked  Timeout: Immediately prior to procedure a time out was called to verify the correct patient, procedure, equipment, support staff and site/side marked as required   Supporting Documentation  Indications: pain   Procedure Details  Location: knee - L knee  Preparation: Patient was prepped and draped in the usual sterile fashion  Needle gauge: 21 G.  Medications administered: 48 mg hylan 48 MG/6ML  Patient tolerance: patient tolerated the procedure well with no immediate complications       This injection documentation was Scribed for RENNY Wesley by Lisa Pardo MA.  08/27/24   14:50 EDT         Assessment and Plan   Problem List Items Addressed This Visit    None  Visit Diagnoses       Bilateral primary osteoarthritis of knee    -  Primary    Relevant Orders    Large Joint: R knee    Large Joint: L knee            Follow Up   Return if symptoms worsen or fail to improve.    Patient is a non-smoker, did not discuss options for smoking cessation.     Social History     Socioeconomic History    Marital status:    Tobacco Use    Smoking status: Former     Current packs/day: 1.00     Average packs/day: 1 pack/day for 15.0 years (15.0 ttl pk-yrs)     Types: Cigarettes    Smokeless tobacco: Never    Tobacco comments:     quit 1989   Vaping Use    Vaping status: Never Used   Substance and Sexual Activity    Alcohol use: Yes     Comment: occasionally     Drug use: Never    Sexual activity: Defer       Patient Instructions   Discussed the risk, benefits and alternatives of injection.  Discussed potential complications such as increased pain, swelling and tenderness at the injection site.  Possibility of reaction.  Possibility that injection  may not improve pain. Risk of infection.  Possibility of worsening pain after injection. Patient verbalized understanding and gives verbal consent to proceed.    Patient received Synvisc injection today in office into the bilateral knee and tolerated the procedure well without complication.  Counseled that these injections can be given every 6 months and 1 day with insurance approval. Pt can also receive steroid injections intermittently between these doses.  Steroid injections can be given every 3 months.    Follow up as needed for worsening symptoms or failure to improve. Call with questions, concerns, or worsening symptoms.     Patient was given instructions and counseling regarding his condition or for health maintenance advice. Please see specific information pulled into the AVS if appropriate.

## 2024-09-20 ENCOUNTER — CLINICAL SUPPORT (OUTPATIENT)
Dept: GASTROENTEROLOGY | Facility: CLINIC | Age: 69
End: 2024-09-20
Payer: MEDICARE

## 2024-09-20 ENCOUNTER — PREP FOR SURGERY (OUTPATIENT)
Dept: OTHER | Facility: HOSPITAL | Age: 69
End: 2024-09-20
Payer: MEDICARE

## 2024-09-20 DIAGNOSIS — Z12.11 ENCOUNTER FOR SCREENING FOR MALIGNANT NEOPLASM OF COLON: Primary | ICD-10-CM

## 2024-09-20 RX ORDER — POLYETHYLENE GLYCOL 3350, SODIUM SULFATE ANHYDROUS, SODIUM BICARBONATE, SODIUM CHLORIDE, POTASSIUM CHLORIDE 236; 22.74; 6.74; 5.86; 2.97 G/4L; G/4L; G/4L; G/4L; G/4L
4 POWDER, FOR SOLUTION ORAL ONCE
Qty: 4000 ML | Refills: 0 | Status: SHIPPED | OUTPATIENT
Start: 2024-09-20 | End: 2024-09-20

## 2024-09-20 RX ORDER — GABAPENTIN 600 MG/1
600 TABLET ORAL DAILY
COMMUNITY
Start: 2024-09-17

## 2024-09-20 RX ORDER — OXYCODONE AND ACETAMINOPHEN 10; 325 MG/1; MG/1
1 TABLET ORAL EVERY 4 HOURS PRN
COMMUNITY
Start: 2024-09-04

## 2024-09-20 RX ORDER — DOXYCYCLINE HYCLATE 100 MG
50 TABLET ORAL DAILY
COMMUNITY
Start: 2024-09-17

## 2024-09-20 RX ORDER — CELECOXIB 200 MG/1
200 CAPSULE ORAL DAILY
COMMUNITY
Start: 2024-09-17

## 2024-10-11 ENCOUNTER — HOSPITAL ENCOUNTER (OUTPATIENT)
Dept: GENERAL RADIOLOGY | Facility: HOSPITAL | Age: 69
Discharge: HOME | End: 2024-10-11
Payer: MEDICARE

## 2024-10-11 ENCOUNTER — TRANSCRIBE ORDERS (OUTPATIENT)
Dept: ADMINISTRATIVE | Facility: HOSPITAL | Age: 69
End: 2024-10-11
Payer: MEDICARE

## 2024-10-11 DIAGNOSIS — M79.672 PAIN IN BOTH FEET: ICD-10-CM

## 2024-10-11 DIAGNOSIS — M79.671 PAIN IN BOTH FEET: ICD-10-CM

## 2024-10-11 DIAGNOSIS — M79.672 PAIN IN BOTH FEET: Primary | ICD-10-CM

## 2024-10-11 DIAGNOSIS — M79.671 PAIN IN BOTH FEET: Primary | ICD-10-CM

## 2024-10-11 PROCEDURE — 73630 X-RAY EXAM OF FOOT: CPT

## 2024-12-06 ENCOUNTER — TELEPHONE (OUTPATIENT)
Dept: GASTROENTEROLOGY | Facility: CLINIC | Age: 69
End: 2024-12-06
Payer: MEDICARE

## 2024-12-09 NOTE — PRE-PROCEDURE INSTRUCTIONS
"PAT call attempted.  No answer.  Detailed message with date and arrival time of 0730 given.  Instructed that arrival time is not procedure time but allows time to prepare for procedure. Come to entrance \"C\"; must have adult  for transportation home; may have two visitors; however, children under 12 must remain in waiting area; instructed on diet/clear liquids/NPO/bowel prep, if needed; may take normal meds two hours prior to arrival time except for blood thinners, antidiabetics, diuretics, and weight loss meds.  Instructed to return call to confirm receipt of instructions and for any questions.  "

## 2024-12-12 ENCOUNTER — ANESTHESIA EVENT (OUTPATIENT)
Dept: GASTROENTEROLOGY | Facility: HOSPITAL | Age: 69
End: 2024-12-12
Payer: MEDICARE

## 2024-12-12 NOTE — ANESTHESIA PREPROCEDURE EVALUATION
Anesthesia Evaluation     Patient summary reviewed and Nursing notes reviewed   NPO Solid Status: > 8 hours  NPO Liquid Status: > 4 hours           Airway   Mallampati: I  TM distance: >3 FB  Neck ROM: full  No difficulty expected  Dental - normal exam     Pulmonary - normal exam    breath sounds clear to auscultation  Cardiovascular - normal exam  Exercise tolerance: excellent (>7 METS)    Rhythm: regular  Rate: normal    (+) hypertension well controlled      Neuro/Psych  GI/Hepatic/Renal/Endo    (+) obesity, thyroid problem     Musculoskeletal     (+) neck pain  Abdominal    Substance History      OB/GYN          Other        ROS/Med Hx Other: Screening, hx polyps    No EKG on file                      Anesthesia Plan    ASA 2     general   total IV anesthesia  (Total IV Anesthesia    Patient understands anesthesia not responsible for dental damage.      Discussed risks with pt including aspiration, allergic reactions, apnea, advanced airway placement. Pt verbalized understanding. All questions answered.     )  intravenous induction     Anesthetic plan, risks, benefits, and alternatives have been provided, discussed and informed consent has been obtained with: patient and spouse/significant other.  Pre-procedure education provided  Plan discussed with CRNA.      CODE STATUS:

## 2024-12-13 ENCOUNTER — ANESTHESIA (OUTPATIENT)
Dept: GASTROENTEROLOGY | Facility: HOSPITAL | Age: 69
End: 2024-12-13
Payer: MEDICARE

## 2024-12-13 ENCOUNTER — HOSPITAL ENCOUNTER (OUTPATIENT)
Facility: HOSPITAL | Age: 69
Setting detail: HOSPITAL OUTPATIENT SURGERY
Discharge: HOME | End: 2024-12-13
Attending: INTERNAL MEDICINE | Admitting: INTERNAL MEDICINE
Payer: MEDICARE

## 2024-12-13 VITALS
HEART RATE: 58 BPM | DIASTOLIC BLOOD PRESSURE: 75 MMHG | OXYGEN SATURATION: 99 % | WEIGHT: 184.3 LBS | SYSTOLIC BLOOD PRESSURE: 145 MMHG | BODY MASS INDEX: 26.06 KG/M2 | RESPIRATION RATE: 14 BRPM | TEMPERATURE: 97.1 F

## 2024-12-13 DIAGNOSIS — Z12.11 ENCOUNTER FOR SCREENING FOR MALIGNANT NEOPLASM OF COLON: ICD-10-CM

## 2024-12-13 PROCEDURE — 45385 COLONOSCOPY W/LESION REMOVAL: CPT | Performed by: INTERNAL MEDICINE

## 2024-12-13 PROCEDURE — 25010000002 LIDOCAINE PF 2% 2 % SOLUTION

## 2024-12-13 PROCEDURE — 25010000002 PROPOFOL 10 MG/ML EMULSION

## 2024-12-13 PROCEDURE — 88305 TISSUE EXAM BY PATHOLOGIST: CPT | Performed by: INTERNAL MEDICINE

## 2024-12-13 RX ORDER — SODIUM CHLORIDE 0.9 % (FLUSH) 0.9 %
10 SYRINGE (ML) INJECTION ONCE AS NEEDED
Status: DISCONTINUED | OUTPATIENT
Start: 2024-12-13 | End: 2024-12-13 | Stop reason: HOSPADM

## 2024-12-13 RX ORDER — LIDOCAINE HYDROCHLORIDE 20 MG/ML
INJECTION, SOLUTION EPIDURAL; INFILTRATION; INTRACAUDAL; PERINEURAL AS NEEDED
Status: DISCONTINUED | OUTPATIENT
Start: 2024-12-13 | End: 2024-12-13 | Stop reason: SURG

## 2024-12-13 RX ORDER — PROPOFOL 10 MG/ML
VIAL (ML) INTRAVENOUS AS NEEDED
Status: DISCONTINUED | OUTPATIENT
Start: 2024-12-13 | End: 2024-12-13 | Stop reason: SURG

## 2024-12-13 RX ORDER — CHOLECALCIFEROL (VITAMIN D3) 25 MCG
1000 TABLET ORAL DAILY
COMMUNITY

## 2024-12-13 RX ADMIN — Medication 5 ML: at 09:46

## 2024-12-13 RX ADMIN — PROPOFOL 80 MG: 10 INJECTION, EMULSION INTRAVENOUS at 09:42

## 2024-12-13 RX ADMIN — LIDOCAINE HYDROCHLORIDE 80 MG: 20 INJECTION, SOLUTION EPIDURAL; INFILTRATION; INTRACAUDAL; PERINEURAL at 09:42

## 2024-12-13 RX ADMIN — Medication 10 ML: at 10:16

## 2024-12-13 RX ADMIN — PROPOFOL 200 MCG/KG/MIN: 10 INJECTION, EMULSION INTRAVENOUS at 09:43

## 2024-12-13 RX ADMIN — Medication 5 ML: at 09:42

## 2024-12-13 NOTE — H&P
Pre Procedure History & Physical    Chief Complaint:   Colon cancer polyp surveillance    Subjective     HPI:   Surveillance for colon polyps, patient's mother had colon cancer 61    Past Medical History:   Past Medical History:   Diagnosis Date    Allergies     Disease of thyroid gland     Elevated PSA     Erectile dysfunction     Hypertension        Past Surgical History:  Past Surgical History:   Procedure Laterality Date    COLONOSCOPY      2018 with Dr. Pagan    COLONOSCOPY N/A 12/14/2021    Procedure: COLONOSCOPY WITH POLYPECTOMIES;  Surgeon: Zackary Barcenas MD;  Location: [facility];  Service: Gastroenterology;  Laterality: N/A;  COLON POLYPS, HEMORRHOIDS    HERNIA REPAIR      KNEE ARTHROSCOPY WITH PATELLA FEMORAL LIGAMENT RECONSTRUCTION      Bilateral    ROTATOR CUFF REPAIR      LEFT        Family History:  Family History   Problem Relation Age of Onset    Colon cancer Mother         passed at 61    Malig Hyperthermia Neg Hx        Social History:   reports that he quit smoking about 35 years ago. His smoking use included cigarettes. He started smoking about 49 years ago. He has a 29.2 pack-year smoking history. He has never used smokeless tobacco. He reports that he does not currently use alcohol. He reports that he does not use drugs.    Medications:   Medications Prior to Admission   Medication Sig Dispense Refill Last Dose/Taking    celecoxib (CeleBREX) 200 MG capsule Take 1 capsule by mouth Daily.       cetirizine (ZyrTEC Allergy) 10 MG tablet Take 1 tablet by mouth Daily.       Doxepin HCl 6 MG tablet        doxycycline (VIBRAMYICN) 100 MG tablet Take 0.5 tablets by mouth Daily.       fluticasone (FLONASE) 50 MCG/ACT nasal spray Administer 2 sprays into the nostril(s) as directed by provider Daily.       gabapentin (NEURONTIN) 600 MG tablet Take 1 tablet by mouth Daily.       levothyroxine (SYNTHROID, LEVOTHROID) 88 MCG tablet levothyroxine 88 mcg oral tablet take 1 tablet (88 mcg) by oral  route once daily   Active       loratadine (CLARITIN) 10 MG tablet        oxyCODONE (oxyCONTIN) 10 MG 12 hr tablet Every 12 (Twelve) Hours.       oxyCODONE-acetaminophen (PERCOCET)  MG per tablet Take 1 tablet by mouth Every 4 (Four) Hours As Needed.       sildenafil (VIAGRA) 100 MG tablet Take 1 tablet by mouth As Needed.       tamsulosin (FLOMAX) 0.4 MG capsule 24 hr capsule        temazepam (RESTORIL) 30 MG capsule Take 1 capsule by mouth At Night As Needed.       Testosterone Cypionate (DEPOTESTOTERONE CYPIONATE) 200 MG/ML injection 1 mL.          Allergies:  Cephalexin and Sulfa antibiotics        Objective     Weight 83.6 kg (184 lb 4.9 oz).    Physical Exam   Constitutional: Pt is oriented to person, place, and time and well-developed, well-nourished, and in no distress.   Mouth/Throat: Oropharynx is clear and moist.   Neck: Normal range of motion.   Cardiovascular: Normal rate, regular rhythm and normal heart sounds.    Pulmonary/Chest: Effort normal and breath sounds normal.   Abdominal: Soft. Nontender  Skin: Skin is warm and dry.   Psychiatric: Mood, memory, affect and judgment normal.     Assessment & Plan     Diagnosis:  High risk screening and surveillance    Anticipated Surgical Procedure:  Colonoscopy    The risks, benefits, and alternatives of this procedure have been discussed with the patient or the responsible party- the patient understands and agrees to proceed.

## 2024-12-13 NOTE — ANESTHESIA POSTPROCEDURE EVALUATION
Patient: Steven Mahajan    Procedure Summary       Date: 12/13/24 Room / Location: [facility] ENDOSCOPY 4 / [facility]    Anesthesia Start: 0939 Anesthesia Stop: 1023    Procedure: COLONOSCOPY WITH COLD SNARE POLYPECTOMIES Diagnosis:       Encounter for screening for malignant neoplasm of colon      (Encounter for screening for malignant neoplasm of colon [Z12.11])    Surgeons: Zackary Barcenas MD Provider: Kamilah Wyatt CRNA    Anesthesia Type: general ASA Status: 2            Anesthesia Type: general    Vitals  Vitals Value Taken Time   /75 12/13/24 1052   Temp 36.2 °C (97.1 °F) 12/13/24 1025   Pulse 45 12/13/24 1052   Resp 14 12/13/24 1050   SpO2 99 % 12/13/24 1052   Vitals shown include unfiled device data.        Post Anesthesia Care and Evaluation    Patient location during evaluation: bedside  Patient participation: complete - patient participated  Level of consciousness: awake  Pain management: adequate    Airway patency: patent  Anesthetic complications: No anesthetic complications  PONV Status: controlled  Cardiovascular status: acceptable and stable  Respiratory status: acceptable, spontaneous ventilation and room air

## 2024-12-19 ENCOUNTER — OFFICE VISIT (OUTPATIENT)
Dept: PODIATRY | Facility: CLINIC | Age: 69
End: 2024-12-19
Payer: MEDICARE

## 2024-12-19 VITALS
OXYGEN SATURATION: 95 % | HEIGHT: 71 IN | WEIGHT: 184 LBS | DIASTOLIC BLOOD PRESSURE: 68 MMHG | SYSTOLIC BLOOD PRESSURE: 124 MMHG | BODY MASS INDEX: 25.76 KG/M2 | HEART RATE: 74 BPM

## 2024-12-19 DIAGNOSIS — M19.071 ARTHRITIS OF BOTH FEET: ICD-10-CM

## 2024-12-19 DIAGNOSIS — M79.672 FOOT PAIN, BILATERAL: Primary | ICD-10-CM

## 2024-12-19 DIAGNOSIS — M19.072 ARTHRITIS OF BOTH FEET: ICD-10-CM

## 2024-12-19 DIAGNOSIS — M21.42 PES PLANUS OF BOTH FEET: ICD-10-CM

## 2024-12-19 DIAGNOSIS — M79.671 FOOT PAIN, BILATERAL: Primary | ICD-10-CM

## 2024-12-19 DIAGNOSIS — M21.41 PES PLANUS OF BOTH FEET: ICD-10-CM

## 2024-12-19 NOTE — PROGRESS NOTES
[facility] - PODIATRY    Today's Date: 12/19/24    Patient Name: Steven Mahajan  MRN: [id number]  CSN: [id number]  PCP: Zackary Kenny MD  Referring Provider: Shama Bui APRN    SUBJECTIVE     Chief Complaint   Patient presents with    Left Foot - Establish Care, Pain     Pain on top of foot x intermittent   Xray on chart, 10/11/24    Right Foot - Establish Care, Pain     Pain on top of foot x intermittent     Xray on chart, 10/11/24     HPI: Steven Mahajan, a 69 y.o.male, presents to clinic.    New, Established, New Problem:  new    Location:  dorsal midfoot b/l    Duration:  over the past year    Onset:  insidious    Nature:  sore, achy    Aggravating factors:  Patient relates pain is aggravated by shoe gear and ambulation.      Previous Treatment:  custom-made orthotics, physical therapy, change in shoegear    Patient denies any fevers, chills, nausea, vomiting, shortness of breath, nor any other constitutional signs nor symptoms.    No other pedal complaints at this time.    Past Medical History:   Diagnosis Date    Allergies     Disease of thyroid gland     Elevated PSA     Erectile dysfunction     Foot pain, bilateral     Hypertension      Past Surgical History:   Procedure Laterality Date    COLONOSCOPY      2018 with Dr. Pagan    COLONOSCOPY N/A 12/14/2021    Procedure: COLONOSCOPY WITH POLYPECTOMIES;  Surgeon: Zackary Barcenas MD;  Location: [facility] ENDOSCOPY;  Service: Gastroenterology;  Laterality: N/A;  COLON POLYPS, HEMORRHOIDS    COLONOSCOPY N/A 12/13/2024    Procedure: COLONOSCOPY WITH COLD SNARE POLYPECTOMIES;  Surgeon: Zackary Barcenas MD;  Location: [facility] ENDOSCOPY;  Service: Gastroenterology;  Laterality: N/A;  COLON POLYPS, DIVERTICULOSIS, HEMORRHOIDS    HERNIA REPAIR      KNEE ARTHROSCOPY WITH PATELLA FEMORAL LIGAMENT RECONSTRUCTION      Bilateral    ROTATOR CUFF REPAIR      LEFT      Family History   Problem Relation Age of Onset    Colon cancer Mother          passed at 61    Malig Hyperthermia Neg Hx      Social History     Socioeconomic History    Marital status:    Tobacco Use    Smoking status: Former     Current packs/day: 0.00     Average packs/day: 1 pack/day for 29.2 years (29.2 ttl pk-yrs)     Types: Cigarettes     Start date: 1975     Quit date: 1989     Years since quittin.4    Smokeless tobacco: Never    Tobacco comments:     quit    Vaping Use    Vaping status: Never Used   Substance and Sexual Activity    Alcohol use: Not Currently     Comment: occasionally     Drug use: Never    Sexual activity: Yes     Partners: Female     Birth control/protection: I.U.D.     Allergies   Allergen Reactions    Cephalexin GI Intolerance    Sulfa Antibiotics Unknown - Low Severity     Current Outpatient Medications   Medication Sig Dispense Refill    cetirizine (ZyrTEC Allergy) 10 MG tablet Take 1 tablet by mouth Daily.      Cholecalciferol 25 MCG (1000 UT) tablet Take 1 tablet by mouth Daily.      Doxepin HCl 6 MG tablet       fluticasone (FLONASE) 50 MCG/ACT nasal spray Administer 2 sprays into the nostril(s) as directed by provider Daily.      levothyroxine (SYNTHROID, LEVOTHROID) 88 MCG tablet levothyroxine 88 mcg oral tablet take 1 tablet (88 mcg) by oral route once daily   Active      loratadine (CLARITIN) 10 MG tablet       oxyCODONE (oxyCONTIN) 10 MG 12 hr tablet Every 12 (Twelve) Hours.      oxyCODONE-acetaminophen (PERCOCET)  MG per tablet Take 1 tablet by mouth Every 4 (Four) Hours As Needed.      sildenafil (VIAGRA) 100 MG tablet Take 1 tablet by mouth As Needed.      tamsulosin (FLOMAX) 0.4 MG capsule 24 hr capsule       temazepam (RESTORIL) 30 MG capsule Take 1 capsule by mouth At Night As Needed.      Testosterone Cypionate (DEPOTESTOTERONE CYPIONATE) 200 MG/ML injection 1 mL.      Diclofenac Sodium (VOLTAREN) 1 % gel gel Apply 4 g topically to the appropriate area as directed 4 (Four) Times a Day. 100 g 5     No current  "facility-administered medications for this visit.     Review of Systems   Constitutional: Negative.    Musculoskeletal:  Positive for arthralgias.   All other systems reviewed and are negative.      OBJECTIVE     Vitals:    12/19/24 0855   BP: 124/68   Pulse: 74   SpO2: 95%       No results found for: \"WBC\", \"RBC\", \"HGB\", \"HCT\", \"MCV\", \"MCH\", \"MCHC\", \"RDW\", \"RDWSD\", \"MPV\", \"PLT\", \"NEUTRORELPCT\", \"LYMPHORELPCT\", \"MONORELPCT\", \"EOSRELPCT\", \"BASORELPCT\", \"AUTOIGPER\", \"NEUTROABS\", \"LYMPHSABS\", \"MONOSABS\", \"EOSABS\", \"BASOSABS\", \"AUTOIGNUM\", \"NRBC\"      No results found for: \"GLUCOSE\", \"BUN\", \"CREATININE\", \"NA\", \"K\", \"CL\", \"CALCIUM\", \"PROTEINTOT\", \"ALBUMIN\", \"ALT\", \"AST\", \"ALKPHOS\", \"BILITOT\", \"GLOB\", \"AGRATIO\", \"BCR\", \"ANIONGAP\", \"EGFR\"    Patient seen in no apparent distress.      PHYSICAL EXAM:     Foot/Ankle Exam    GENERAL  Appearance:  appears stated age, elderly and healthy  Orientation:  AAOx3  Affect:  appropriate  Gait:  unimpaired  Assistance:  independent  Right shoe gear: casual shoe  Left shoe gear: casual shoe    VASCULAR     Right Foot Vascularity   Dorsalis pedis:  2+  Posterior tibial:  2+  Skin temperature:  warm  Edema grading:  None  CFT:  < 3 seconds  Pedal hair growth:  Absent  Varicosities:  mild varicosities     Left Foot Vascularity   Dorsalis pedis:  2+  Posterior tibial:  2+  Skin temperature:  warm  Edema grading:  None  CFT:  < 3 seconds  Pedal hair growth:  Absent  Varicosities:  mild varicosities     NEUROLOGIC     Right Foot Neurologic   Normal sensation    Light touch sensation: normal  Vibratory sensation: normal  Hot/Cold sensation: normal  Protective Sensation using [place] Monofilament:   Sites intact: 10  Sites tested: 10     Left Foot Neurologic   Normal sensation    Light touch sensation: normal  Vibratory sensation: normal  Hot/Cold sensation:  normal  Protective Sensation using [place] Monofilament:   Sites intact: 10  Sites tested: 10    MUSCULOSKELETAL     " Right Foot Musculoskeletal   Tenderness:  midtarsal joints tenderness    Arch:  Pes planus  Hallux valgus: Yes       Left Foot Musculoskeletal   Tenderness:  midtarsal joints tenderness  Arch:  Pes planus  Hallux valgus: Yes      MUSCLE STRENGTH     Right Foot Muscle Strength   Foot dorsiflexion:  4  Foot plantar flexion:  4  Foot inversion:  4  Foot eversion:  4     Left Foot Muscle Strength   Foot dorsiflexion:  4  Foot plantar flexion:  4  Foot inversion:  4  Foot eversion:  4    RANGE OF MOTION     Right Foot Range of Motion   Foot and ankle ROM within normal limits       Left Foot Range of Motion   Foot and ankle ROM within normal limits      DERMATOLOGIC      Right Foot Dermatologic   Skin  Right foot skin is intact.      Left Foot Dermatologic   Skin  Left foot skin is intact.     ASSESSMENT/PLAN     Diagnoses and all orders for this visit:    1. Foot pain, bilateral (Primary)    2. Arthritis of both feet  -     Diclofenac Sodium (VOLTAREN) 1 % gel gel; Apply 4 g topically to the appropriate area as directed 4 (Four) Times a Day.  Dispense: 100 g; Refill: 5    3. Pes planus of both feet    Comprehensive lower extremity examination and evaluation was performed.    Discussed findings and treatment plan including risks, benefits, and treatment options with patient in detail. Patient agreed with treatment plan.    Medications and allergies reviewed.  Reviewed available lab values along with other pertinent labs.  These were discussed with the patient.    Discussed proper shoegear for the patient's feet and medical condition.  Patient states understanding and agreement with this plan.    Rice Therapy: It is important to treat any injury as soon as possible to help control swelling and increase recovery time. The recognized regimen for immediate treatment of sport injuries includes rest, ice (cold application), compression, and elevation (RICE). Remove the injured athlete from play, apply ice to the affected area,  wrap or compress the injured area with an elastic bandage when appropriate, and elevate the injured area above heart level to reduce swelling.  The patient is to not use ice for longer than 20 minutes at a time, with at least 20 minutes of no ice usage between applications.     Patient instructed use OTC analgesics with dosing per package insert as needed.      The patient states understanding and agreement with this plan.    An After Visit Summary was printed and given to the patient at discharge, including (if requested) any available informative/educational handouts regarding diagnosis, treatment, or medications. All questions were answered to patient/family satisfaction. Should symptoms fail to improve or worsen they agree to call or return to clinic or to go to the Emergency Department. Discussed the importance of following up with any needed screening tests/labs/specialist appointments and any requested follow-up recommended by me today. Importance of maintaining follow-up discussed and patient accepts that missed appointments can delay diagnosis and potentially lead to worsening of conditions.    Return if symptoms worsen or fail to improve., or sooner if acute issues arise.    This document has been electronically signed by Rafal Steve DPM on December 19, 2024 09:35 EST

## 2025-01-21 ENCOUNTER — OFFICE VISIT (OUTPATIENT)
Dept: ORTHOPEDIC SURGERY | Facility: CLINIC | Age: 70
End: 2025-01-21
Payer: MEDICARE

## 2025-01-21 VITALS
HEIGHT: 70 IN | BODY MASS INDEX: 25.05 KG/M2 | SYSTOLIC BLOOD PRESSURE: 120 MMHG | OXYGEN SATURATION: 98 % | HEART RATE: 85 BPM | DIASTOLIC BLOOD PRESSURE: 75 MMHG | WEIGHT: 175 LBS

## 2025-01-21 DIAGNOSIS — M75.101 TEAR OF RIGHT ROTATOR CUFF, UNSPECIFIED TEAR EXTENT, UNSPECIFIED WHETHER TRAUMATIC: ICD-10-CM

## 2025-01-21 DIAGNOSIS — M75.82 ROTATOR CUFF TENDONITIS, LEFT: ICD-10-CM

## 2025-01-21 DIAGNOSIS — M25.511 RIGHT SHOULDER PAIN, UNSPECIFIED CHRONICITY: Primary | ICD-10-CM

## 2025-01-21 DIAGNOSIS — M75.81 ROTATOR CUFF TENDONITIS, RIGHT: ICD-10-CM

## 2025-01-21 DIAGNOSIS — M75.20 BICEPS TENDINITIS, UNSPECIFIED LATERALITY: ICD-10-CM

## 2025-01-21 DIAGNOSIS — M75.102 TEAR OF LEFT ROTATOR CUFF, UNSPECIFIED TEAR EXTENT, UNSPECIFIED WHETHER TRAUMATIC: ICD-10-CM

## 2025-01-21 RX ADMIN — TRIAMCINOLONE ACETONIDE 40 MG: 40 INJECTION, SUSPENSION INTRA-ARTICULAR; INTRAMUSCULAR at 15:53

## 2025-01-21 RX ADMIN — LIDOCAINE HYDROCHLORIDE 5 ML: 10 INJECTION, SOLUTION INFILTRATION; PERINEURAL at 15:53

## 2025-01-21 RX ADMIN — LIDOCAINE HYDROCHLORIDE 5 ML: 10 INJECTION, SOLUTION INFILTRATION; PERINEURAL at 15:54

## 2025-01-21 RX ADMIN — TRIAMCINOLONE ACETONIDE 40 MG: 40 INJECTION, SUSPENSION INTRA-ARTICULAR; INTRAMUSCULAR at 15:54

## 2025-01-21 NOTE — PROGRESS NOTES
"Chief Complaint  Follow-up and Pain of the Right Shoulder and Follow-up and Pain of the Left Shoulder     Subjective      Steven Mahajan presents to [facility] for a follow up for bilateral shoulder. He has been treating his bilateral shoulder tendonitis conservatively. He was last seen in the office on 24 where he had bilateral shoulder steroid injections. He reports these injections gave him great relief and is requesting repeat injections today in the office. He states his right shoulder is worse and has noticed a decrease in strength and range of motion. He denies any new injury or falls.     Allergies   Allergen Reactions    Cephalexin GI Intolerance    Sulfa Antibiotics Unknown - Low Severity        Social History     Socioeconomic History    Marital status:    Tobacco Use    Smoking status: Former     Current packs/day: 0.00     Average packs/day: 1 pack/day for 29.2 years (29.2 ttl pk-yrs)     Types: Cigarettes     Start date: 1975     Quit date: 1989     Years since quittin.4    Smokeless tobacco: Never    Tobacco comments:     quit    Vaping Use    Vaping status: Never Used   Substance and Sexual Activity    Alcohol use: Not Currently     Comment: occasionally     Drug use: Never    Sexual activity: Yes     Partners: Female     Birth control/protection: I.U.D.        I reviewed the patient's chief complaint, history of present illness, review of systems, past medical history, surgical history, family history, social history, medications, and allergy list.     Review of Systems     Constitutional: Denies fevers, chills, weight loss  Cardiovascular: Denies chest pain, shortness of breath  Skin: Denies rashes, acute skin changes  Neurologic: Denies headache, loss of consciousness  MSK: Bilateral shoulder pain       Vital Signs:   /75   Pulse 85   Ht 177.8 cm (70\")   Wt 79.4 kg (175 lb)   SpO2 98%   BMI 25.11 kg/m²            Ortho Exam  "   Physical Exam  General:Alert. No acute distress     Right shoulder- Forward elevation 170. Abduction 145. External Rotation 85. Internal rotation 70. Sensation to light touch median, radial, ulnar nerve. Positive AIN, PIN, ulnar nerve motor function. Positive pulses. 5/5 rotator cuff testing. Internal rotation to L-1. Negative lift off. Negative impingement. Negative cross arm adduction      Left shoulder- Forward elevation 175. Abduction 130. External Rotation 80. Internal rotation 70. Sensation to light touch median, radial, ulnar nerve. Positive AIN, PIN, ulnar nerve motor function. Positive pulses. 5/5 rotator cuff testing. Internal rotation to T-12. Negative lift off. Pain with impingement testing. Negative cross arm adduction     Large Joint Arthrocentesis  Date/Time: 1/21/2025 3:53 PM  Consent given by: patient  Site marked: site marked  Timeout: Immediately prior to procedure a time out was called to verify the correct patient, procedure, equipment, support staff and site/side marked as required   Supporting Documentation  Indications: pain   Procedure Details  Location: shoulder (LEFT) -   Needle gauge: 21 G.  Medications administered: 5 mL lidocaine 1 %; 40 mg triamcinolone acetonide 40 MG/ML  Patient tolerance: patient tolerated the procedure well with no immediate complications      Large Joint Arthrocentesis  Date/Time: 1/21/2025 3:54 PM  Consent given by: patient  Site marked: site marked  Timeout: Immediately prior to procedure a time out was called to verify the correct patient, procedure, equipment, support staff and site/side marked as required   Supporting Documentation  Indications: pain   Procedure Details  Location: shoulder (RIGHT) -   Needle gauge: 21 G.  Medications administered: 5 mL lidocaine 1 %; 40 mg triamcinolone acetonide 40 MG/ML  Patient tolerance: patient tolerated the procedure well with no immediate complications      This injection documentation was Scribed for Bethel MIRANDA  MD Yessica by Juani Damon.  01/21/25   15:54 EST      Imaging Results (Most Recent)       None             Result Review :       No results found.           Assessment and Plan     Diagnoses and all orders for this visit:    1. Right shoulder pain, unspecified chronicity (Primary)    2. Biceps tendinitis, unspecified laterality    3. Rotator cuff tendonitis, left    4. Tear of left rotator cuff, unspecified tear extent, unspecified whether traumatic    5. Tear of right rotator cuff, unspecified tear extent, unspecified whether traumatic    6. Rotator cuff tendonitis, right      The patient presents here today for a follow up for his bilateral shoulder.     Discussed the risks and benefits of bilateral shoulder steroid injections today in the office. Patient expressed understanding and wishes to proceed. He tolerated the injections well and without any complications.     MRI order placed today to evaluate for internal derangement.     Home exercises given today and will continue current medications for pain control.     Call or return if worsening symptoms.    Follow Up     After MRI     Patient was given instructions and counseling regarding his condition or for health maintenance advice. Please see specific information pulled into the AVS if appropriate.     Scribed for Bethel Juan MD by Rosmery Hurst.  01/21/25   15:38 EST    I have personally performed the services described in this document as scribed by the above individual and it is both accurate and complete. Bethel Juan MD 01/22/25

## 2025-01-22 RX ORDER — LIDOCAINE HYDROCHLORIDE 10 MG/ML
5 INJECTION, SOLUTION INFILTRATION; PERINEURAL
Status: COMPLETED | OUTPATIENT
Start: 2025-01-21 | End: 2025-01-21

## 2025-01-22 RX ORDER — TRIAMCINOLONE ACETONIDE 40 MG/ML
40 INJECTION, SUSPENSION INTRA-ARTICULAR; INTRAMUSCULAR
Status: COMPLETED | OUTPATIENT
Start: 2025-01-21 | End: 2025-01-21

## 2025-02-25 ENCOUNTER — HOSPITAL ENCOUNTER (OUTPATIENT)
Dept: GENERAL RADIOLOGY | Facility: HOSPITAL | Age: 70
Discharge: HOME | End: 2025-02-25
Payer: MEDICARE

## 2025-02-25 ENCOUNTER — HOSPITAL ENCOUNTER (OUTPATIENT)
Dept: MRI IMAGING | Facility: HOSPITAL | Age: 70
Discharge: HOME | End: 2025-02-25
Payer: MEDICARE

## 2025-02-25 ENCOUNTER — TRANSCRIBE ORDERS (OUTPATIENT)
Dept: ADMINISTRATIVE | Facility: HOSPITAL | Age: 70
End: 2025-02-25
Payer: MEDICARE

## 2025-02-25 DIAGNOSIS — R05.3 CHRONIC COUGH: ICD-10-CM

## 2025-02-25 DIAGNOSIS — R05.3 CHRONIC COUGH: Primary | ICD-10-CM

## 2025-02-25 DIAGNOSIS — M25.511 RIGHT SHOULDER PAIN, UNSPECIFIED CHRONICITY: ICD-10-CM

## 2025-02-25 PROCEDURE — 71046 X-RAY EXAM CHEST 2 VIEWS: CPT

## 2025-02-25 PROCEDURE — 73221 MRI JOINT UPR EXTREM W/O DYE: CPT

## 2025-02-27 ENCOUNTER — TELEPHONE (OUTPATIENT)
Dept: ORTHOPEDIC SURGERY | Facility: CLINIC | Age: 70
End: 2025-02-27
Payer: MEDICARE

## 2025-02-27 NOTE — TELEPHONE ENCOUNTER
LT VM FOR PT TO CALL OFFICE TO SCHEDULE HIS APT FOR RIGHT SHOULDER MRI RESULTS WITH CORDELIA CHURCHILL. OK FOR HUB TO SCHEDULE TO NEXT AVAILABILITY

## 2025-02-27 NOTE — TELEPHONE ENCOUNTER
Tried calling pt to sched appt for fup MRI no answer and vm full   Sent message thru My Chart to sched    HUB ok to relay and sched

## 2025-03-06 ENCOUNTER — OFFICE VISIT (OUTPATIENT)
Dept: ORTHOPEDIC SURGERY | Facility: CLINIC | Age: 70
End: 2025-03-06
Payer: MEDICARE

## 2025-03-06 ENCOUNTER — PREP FOR SURGERY (OUTPATIENT)
Dept: OTHER | Facility: HOSPITAL | Age: 70
End: 2025-03-06
Payer: MEDICARE

## 2025-03-06 VITALS
OXYGEN SATURATION: 94 % | DIASTOLIC BLOOD PRESSURE: 71 MMHG | WEIGHT: 175 LBS | BODY MASS INDEX: 25.05 KG/M2 | SYSTOLIC BLOOD PRESSURE: 119 MMHG | HEIGHT: 70 IN | HEART RATE: 93 BPM

## 2025-03-06 DIAGNOSIS — M75.121 NONTRAUMATIC COMPLETE TEAR OF RIGHT ROTATOR CUFF: Primary | ICD-10-CM

## 2025-03-06 NOTE — PROGRESS NOTES
"Chief Complaint  Follow-up of the Right Shoulder       Subjective      Steven Mahajan presents to [facility] for a follow up for his right shoulder. He was last seen in the office on 25 where we ordered an MRI on his right shoulder. He presents to the office today for these results. He has been treating his shoulder tendonitis conservatively with injections. He had a right shoulder steroid injection done on 25 and states he got minimal relief. He states he is having decrease in strength to his right shoulder. He denies any new injury or falls since his last visit.  He has had prior surgery on his right shoulder in the past.     Allergies   Allergen Reactions    Cephalexin GI Intolerance    Sulfa Antibiotics Unknown - Low Severity        Social History     Socioeconomic History    Marital status:    Tobacco Use    Smoking status: Former     Current packs/day: 0.00     Average packs/day: 1 pack/day for 29.2 years (29.2 ttl pk-yrs)     Types: Cigarettes     Start date: 1975     Quit date: 1989     Years since quittin.6    Smokeless tobacco: Never    Tobacco comments:     quit    Vaping Use    Vaping status: Never Used   Substance and Sexual Activity    Alcohol use: Not Currently     Comment: occasionally     Drug use: Never    Sexual activity: Yes     Partners: Female     Birth control/protection: I.U.D.        I reviewed the patient's chief complaint, history of present illness, review of systems, past medical history, surgical history, family history, social history, medications, and allergy list.     Review of Systems     Constitutional: Denies fevers, chills, weight loss  Cardiovascular: Denies chest pain, shortness of breath  Skin: Denies rashes, acute skin changes  Neurologic: Denies headache, loss of consciousness  MSK: right shoulder pain       Vital Signs:   /71   Pulse 93   Ht 177.8 cm (70\")   Wt 79.4 kg (175 lb)   SpO2 94%   BMI " 25.11 kg/m²            Ortho Exam    Physical Exam  General:Alert. No acute distress     Right upper extremity: forward elevation  to 170 degrees, abduction to 145 degrees, external rotation  to 85 degrees, internal rotation to 70 degrees, Sensation to light touch median, radial, ulnar nerve. Positive AIN, PIN, ulnar nerve motor function. Positive pulses. 5/5 rotator cuff testing. Internal rotation to L-1. Negative lift off. Negative impingement. Negative cross arm adduction        Procedures    Imaging Results (Most Recent)       None             Result Review :       MRI Shoulder Right Without Contrast    Result Date: 2/26/2025  Narrative: MRI SHOULDER RIGHT WO CONTRAST Date of Exam: 2/25/2025 1:25 PM EST Indication: pain.  Comparison: Right shoulder x-rays 9/19/2023 Technique:  Routine multiplanar/multisequence images of the right shoulder were obtained without contrast administration.  Findings: There is a near complete tear of the supraspinatus tendon from the footprint with medial retraction of the torn tendon fibers/tendon gap measuring 1.8 cm. Some of the anterior-most fibers of the supraspinatus tendon may remain intact (series 6 image 9). There is contiguous full-thickness, partial-width tearing of the anterior and mid distal fibers of the infraspinatus tendon at the footprint, with background tendinosis. The teres minor tendon is intact. There is low-grade interstitial tearing of the subscapularis tendon at and just proximal to the footprint. There is some fatty infiltration of the infraspinatus muscle belly. Otherwise unremarkable appearance of the rotator cuff muscle bellies. The long head of the biceps tendon is intact and normally positioned within the intertubercular groove. There is some tendinosis of the intra-articular segment. Normal alignment of the glenohumeral joint. No high-grade glenohumeral articular cartilage loss; there is mild diffuse chondral thinning in the central articulation. There  is a physiologic amount of glenohumeral joint fluid. There is suspected small tearing at the posterior superior labrum (series 6 image 14). There is tapered appearance of the distal clavicle and distal acromion, with widened AC joint space, as seen on prior x-ray, with some adjacent micrometallic susceptibility artifact in this region, presumably reflecting prior surgical change and correlate with surgical history. There is fluid in the subacromial-subdeltoid bursa. No focal bony lesion. No fracture or bony contusion. No evidence of muscle strain.     Impression: Impression: Near complete tear of the supraspinatus tendon from the footprint. Full-thickness, partial-width tearing of the anterior and mid distal fibers of the infraspinatus tendon at the footprint. Low-grade interstitial tearing of the subscapularis tendon. Tendinosis of the intra-articular long head of biceps tendon. Electronically Signed: Mark Geller MD  2/26/2025 1:46 PM EST  Workstation ID: [id number]    XR Chest PA & Lateral    Result Date: 2/25/2025  Narrative: XR CHEST PA AND LATERAL Date of Exam: 2/25/2025 1:16 PM EST Indication: CHRONIC COUGH Comparison: None available. Findings: The cardiomediastinal silhouette is within normal limits. Pulmonary vascularity appears normal. There is no focal airspace consolidation, pleural effusion, or pneumothorax. There are no acute osseous findings of the chest.     Impression: Impression: 1. No acute cardiopulmonary abnormality. Electronically Signed: Gustabo Pleitez  2/25/2025 10:44 PM EST  Workstation ID: [id number]            Assessment and Plan     Diagnoses and all orders for this visit:    1. Nontraumatic complete tear of right rotator cuff (Primary)        The patient presents here today for a follow up for his right shoulder. MRI results was discussed and reviewed with the patient today in the office.    Discussed operative treatment options regarding a right shoulder arthroscopy with rotator cuff  repair, possible biceps tendonesis versus tendotomy subacromial decompression versus non operative treatment options regarding injections, oral/topical medications and therapy.     Patient wishes to proceed with operative treatment options. Risks and benefits was discussed and reviewed with the patient today. Patient expressed understanding and wishes to proceed.      Discussed surgery., Risks/benefits discussed with patient including, but not limited to: infection, bleeding, neurovascular damage, re-rupture, aesthetic deformity, need for further surgery, and death., Surgery pamphlet given., and Call or return if worsening symptoms.    Follow Up     2 weeks post-operatively      Patient was given instructions and counseling regarding his condition or for health maintenance advice. Please see specific information pulled into the AVS if appropriate.     Scribed for Bethel Juan MD by Rosmery Hurst.  03/06/25   08:36 EST    I have personally performed the services described in this document as scribed by the above individual and it is both accurate and complete. Bethel Juan MD 03/07/25

## 2025-05-13 ENCOUNTER — TELEPHONE (OUTPATIENT)
Dept: ORTHOPEDIC SURGERY | Facility: CLINIC | Age: 70
End: 2025-05-13
Payer: MEDICARE

## 2025-05-13 NOTE — TELEPHONE ENCOUNTER
SPOKE WITH PATIENT, LET HIM KNOW THAT THIS IS LEGIT AND IS AN ICE MACHINE THROUGH [employer]. HE VOICED UNDERSTANDING AND WISHED TO CALL THEM BACK TO REQUEST DEVICE.

## 2025-05-13 NOTE — TELEPHONE ENCOUNTER
"Provider: MARISOL    Caller: Steven Mahajan \"Les\"    Relationship to Patient: Self    Phone Number: [phone]*    Reason for Call: PT STATES HE HAS RT SHOULDER SX ON 5.19.25 AND HAS BEEN RECEIVING CALLS FROM [employer] IN [place] AND THEY ARE MENTIONING PAIN MGMT. PT IS NOT SURE IF THIS IS LEGIT AND WANTING TO CHECK WITH OFFICE. PT STATES THEIR PHONE NUMBER [phone]. PLEASE ADVISE.  "

## 2025-05-16 NOTE — PRE-PROCEDURE INSTRUCTIONS
PATIENT INSTRUCTED TO BE:    - NOTHING TO EAT AFTER MIDNIGHT OR CHEW, EXCEPT CAN HAVE CLEAR LIQUIDS 2 HOURS PRIOR TO SURGERY ARRIVAL TIME , NO MORE THAN 8 OZ. (NOTHING RED)     - TO HOLD ALL VITAMINS, SUPPLEMENTS, NSAIDS FOR ONE WEEK PRIOR TO THEIR SURGICAL PROCEDURE    - INSTRUCTED PT TO USE SURGICAL SOAP 1 TIME THE NIGHT PRIOR TO SURGERY __5/18/25_ USE THE SOAP FROM NECK TO TOES, AVOID THEIR FACE, HAIR, AND PRIVATE PARTS. IF USE THE SOAP THE NIGHT PRIOR TO SURGERY, CHANGE BED LINENS AND NO PETS IN THE BED.     INSTRUCTED NO LOTIONS, JEWELRY, PIERCINGS,  NAIL POLISH, OR DEODORANT DAY OF SURGERY    -INSTRUCTED TO TAKE THE FOLLOWING MEDICATIONS THE DAY OF SURGERY WITH SIPS OF WATER:   Albuterol inhaler, oxycodone if needed, cetirizine, levothyroxine.    - DO NOT BRING ANY MEDICATIONS WITH YOU TO THE HOSPITAL THE DAY OF SURGERY, EXCEPT IF USE INHALERS. BRING INHALERS DAY OF SURGERY       - DO NOT SMOKE OR VAPE 24 HOURS PRIOR TO PROCEDURE PER ANESTHESIA REQUEST     -MAKE SURE YOU HAVE A RIDE HOME OR SOMEONE TO STAY WITH YOU THE DAY OF THE PROCEDURE AFTER YOU GO HOME     - FOLLOW ANY OTHER INSTRUCTIONS GIVEN TO YOU BY YOUR SURGEON'S OFFICE.     DAY OF SURGERY _5/19/25 AT [facility] ([facility]),  PARK IN THE OPEN LOT, COME TO [facility], FIRST FLOOR, CHECK IN AT THE DESK FOR REGISTRATION/ SURGERY     - YOU WILL RECEIVE A PHONE CALL THE DAY PRIOR TO SURGERY BETWEEN 1PM AND 4 PM WITH ARRIVAL TIME, IF YOUR SURGERY IS ON A MONDAY YOU WILL RECEIVE A CALL THE FRIDAY PRIOR TO SURGERY DATE    - BRING CASH OR CREDIT CARD FOR COPAYMENT OF MEDICATIONS AFTER SURGERY IF YOU USE THE HOSPITAL PHARMACY (MEDS TO BED)    - PREADMISSION TESTING NURSE JORGE Cook [phone]  IF HAVE ANY QUESTIONS     -PATIENT PROVIDED THE NUMBER FOR PREOP SURGICAL DEPT IF HAD QUESTIONS AFTER HOURS PRIOR TO SURGERY ([phone]).  INFORMED PT IF NO ANSWER, LEAVE A MESSAGE AND SOMEONE WILL RETURN THEIR CALL       PATIENT  VERBALIZED UNDERSTANDING       Clear Liquid Diet        Find out when you need to start a clear liquid diet.   Think of “clear liquids” as anything you could read a newspaper through. This includes things like water, broth, sports drinks, or tea WITHOUT any kind of milk or cream.           Once you are told to start a clear liquid diet, only drink these things until 2 hours before arrival to the hospital or when the hospital says to stop. Total volume limitation: 8 oz.       Clear liquids you CAN drink:   Water   Clear broth: beef, chicken, vegetable, or bone broth with nothing in it   Gatorade   Lemonade or Aleksey-aid   Soda   Tea, coffee (NO cream or honey)   Jell-O (without fruit)   Popsicles (without fruit or cream)   Italian ices   Juice without pulp: apple, white, grape   You may use salt, pepper, and sugar  NO RED  NO NOODLES    Do NOT drink:   Milk or cream   Soy milk, almond milk, coconut milk, or other non-dairy drinks and   creamers   Milkshakes or smoothies   Tomato juice   Orange juice   Grapefruit juice   Cream soups or any other than broth         Clear Liquid Diet:  Do NOT eat any solid food.  Do NOT eat or suck on mints or candy.  Do NOT chew gum.  Do NOT drink thick liquids like milk or juice with pulp in it.  Do NOT add milk, cream, or anything like soy milk or almond milk to coffee or tea.

## 2025-05-18 NOTE — H&P
[facility]   HISTORY AND PHYSICAL    Patient Name: Steven Mahajan  : 1955  MRN: [id number]  Primary Care Physician:  Zackary Kenny MD  Date of admission: (Not on file)    Subjective   Subjective     Chief Complaint: Right shoulder pain    History of Present Illness: The patient is a 70-year-old male with right shoulder pain.  He reports continued pain and symptoms despite conservative treatment.  The symptoms are worsening and are present with activity and rest.  He has tried nonoperative management without relief and wishes to undergo operative treatment.    Review of Systems : Negative except for those mentioned in the history of present illness    Personal History     Past Medical History:   Diagnosis Date    Allergies     Asthma     Disease of thyroid gland     Elevated PSA     Erectile dysfunction     Foot pain, bilateral     Hypertension     Sleep apnea     borderline  no cpap       Past Surgical History:   Procedure Laterality Date    CLAVICLE SURGERY      COLONOSCOPY       with Dr. Pagan    COLONOSCOPY N/A 2021    Procedure: COLONOSCOPY WITH POLYPECTOMIES;  Surgeon: Zackary Barcenas MD;  Location: [facility] ENDOSCOPY;  Service: Gastroenterology;  Laterality: N/A;  COLON POLYPS, HEMORRHOIDS    COLONOSCOPY N/A 2024    Procedure: COLONOSCOPY WITH COLD SNARE POLYPECTOMIES;  Surgeon: Zackary Barcenas MD;  Location: [facility] ENDOSCOPY;  Service: Gastroenterology;  Laterality: N/A;  COLON POLYPS, DIVERTICULOSIS, HEMORRHOIDS    HERNIA REPAIR      KNEE ARTHROSCOPY WITH PATELLA FEMORAL LIGAMENT RECONSTRUCTION      Bilateral    ROTATOR CUFF REPAIR      LEFT        Family History: family history includes Colon cancer in his mother. Otherwise pertinent FHx was reviewed and not pertinent to current issue.    Social History:  reports that he quit smoking about 35 years ago. His smoking use included cigarettes. He started smoking about 49 years ago. He has a 29.2 pack-year smoking history.  He has never used smokeless tobacco. He reports that he does not currently use alcohol. He reports current drug use. Drug: Marijuana.    Home Medications:  Doxepin HCl, Testosterone Cypionate, cetirizine, cholecalciferol, levothyroxine, oxyCODONE, oxyCODONE-acetaminophen, sildenafil, tamsulosin, and temazepam    Allergies:  Allergies   Allergen Reactions    Sulfa Antibiotics GI Intolerance and Unknown - Low Severity     Projectile vomiting     Cephalexin GI Intolerance       Objective    Objective     Vitals:        Physical Exam  General: No apparent distress, alert and oriented x 3  HEENT: Normocephalic/atraumatic  Neck: Supple  Cardiovascular: Regular heart rate  Chest: Unlabored breathing  Abdomen: Soft, nontender and nondistended  Musculoskeletal: Reduced range of motion of the right shoulder.  Reduced rotator cuff strength of the shoulder.  Positive pulses.  Tender to palpation to the shoulder.  Positive impingement signs.  Neurovascular intact to extremity.  Neurological: Grossly intact    Result Review    Result Review:  I have personally reviewed the results from the time of this admission to 5/18/2025 12:31 EDT and agree with these findings:  []  Laboratory list / accordion  []  Microbiology  []  Radiology  []  EKG/Telemetry   []  Cardiology/Vascular   []  Pathology  []  Old records  []  Other:  Most notable findings include: MRI:Near complete tear of the supraspinatus tendon from the footprint. Full-thickness, partial-width tearing of the anterior and mid distal fibers of the infraspinatus tendon at the footprint. Low-grade interstitial tearing of the subscapularis tendon.        Tendinosis of the intra-articular long head of biceps tendon.      Assessment & Plan   Assessment / Plan     Brief Patient Summary:  Steven Mahajan is a 70 y.o. male who has right shoulder rotator cuff tear    Active Hospital Problems:  Active Hospital Problems    Diagnosis     **Nontraumatic complete tear of right rotator cuff       Plan:   I discussed treatment options with the patient.  Discussed.  Risks and benefits of surgery were discussed.  Informed consent was obtained and the patient wishes to proceed with right shoulder arthroscopy with rotator cuff repair.    VTE Prophylaxis:  No VTE prophylaxis order currently exists.        CODE STATUS:       Admission Status:  I believe this patient meets outpatient status.    Bethel Juan MD

## 2025-05-19 ENCOUNTER — ANESTHESIA EVENT CONVERTED (OUTPATIENT)
Dept: ANESTHESIOLOGY | Facility: HOSPITAL | Age: 70
End: 2025-05-19
Payer: MEDICARE

## 2025-05-19 ENCOUNTER — ANESTHESIA EVENT (OUTPATIENT)
Dept: PERIOP | Facility: HOSPITAL | Age: 70
End: 2025-05-19
Payer: MEDICARE

## 2025-05-19 ENCOUNTER — HOSPITAL ENCOUNTER (OUTPATIENT)
Facility: HOSPITAL | Age: 70
Setting detail: HOSPITAL OUTPATIENT SURGERY
Discharge: HOME | End: 2025-05-19
Attending: ORTHOPAEDIC SURGERY | Admitting: ORTHOPAEDIC SURGERY
Payer: MEDICARE

## 2025-05-19 ENCOUNTER — ANESTHESIA (OUTPATIENT)
Dept: PERIOP | Facility: HOSPITAL | Age: 70
End: 2025-05-19
Payer: MEDICARE

## 2025-05-19 VITALS
RESPIRATION RATE: 16 BRPM | SYSTOLIC BLOOD PRESSURE: 164 MMHG | OXYGEN SATURATION: 97 % | WEIGHT: 182.76 LBS | DIASTOLIC BLOOD PRESSURE: 80 MMHG | BODY MASS INDEX: 25.59 KG/M2 | HEART RATE: 57 BPM | HEIGHT: 71 IN | TEMPERATURE: 97 F

## 2025-05-19 DIAGNOSIS — M75.121 NONTRAUMATIC COMPLETE TEAR OF RIGHT ROTATOR CUFF: ICD-10-CM

## 2025-05-19 LAB
QT INTERVAL: 376 MS
QTC INTERVAL: 336 MS

## 2025-05-19 PROCEDURE — 25010000002 SUGAMMADEX 200 MG/2ML SOLUTION: Performed by: NURSE ANESTHETIST, CERTIFIED REGISTERED

## 2025-05-19 PROCEDURE — 25010000002 MIDAZOLAM PER 1MG: Performed by: ANESTHESIOLOGY

## 2025-05-19 PROCEDURE — 25010000002 HYDROMORPHONE 1 MG/ML SOLUTION: Performed by: NURSE ANESTHETIST, CERTIFIED REGISTERED

## 2025-05-19 PROCEDURE — 25010000002 LIDOCAINE PF 2% 2 % SOLUTION: Performed by: NURSE ANESTHETIST, CERTIFIED REGISTERED

## 2025-05-19 PROCEDURE — 93005 ELECTROCARDIOGRAM TRACING: CPT | Performed by: ANESTHESIOLOGY

## 2025-05-19 PROCEDURE — 25010000002 FENTANYL CITRATE (PF) 50 MCG/ML SOLUTION: Performed by: ANESTHESIOLOGY

## 2025-05-19 PROCEDURE — 25010000002 FENTANYL CITRATE (PF) 50 MCG/ML SOLUTION: Performed by: NURSE ANESTHETIST, CERTIFIED REGISTERED

## 2025-05-19 PROCEDURE — 25010000002 ROPIVACAINE PER 1 MG: Performed by: ANESTHESIOLOGY

## 2025-05-19 PROCEDURE — 25010000002 PROPOFOL 10 MG/ML EMULSION: Performed by: NURSE ANESTHETIST, CERTIFIED REGISTERED

## 2025-05-19 PROCEDURE — C1713 ANCHOR/SCREW BN/BN,TIS/BN: HCPCS | Performed by: ORTHOPAEDIC SURGERY

## 2025-05-19 PROCEDURE — 25010000002 ONDANSETRON PER 1 MG: Performed by: NURSE ANESTHETIST, CERTIFIED REGISTERED

## 2025-05-19 PROCEDURE — 25010000002 CLINDAMYCIN 900 MG/50ML SOLUTION: Performed by: ORTHOPAEDIC SURGERY

## 2025-05-19 PROCEDURE — 25010000002 DEXAMETHASONE PER 1 MG: Performed by: NURSE ANESTHETIST, CERTIFIED REGISTERED

## 2025-05-19 PROCEDURE — 25810000003 LACTATED RINGERS PER 1000 ML: Performed by: ANESTHESIOLOGY

## 2025-05-19 DEVICE — SUT FIBERLINK BR PB NO2 W/CLSDLP 1.5IN: Type: IMPLANTABLE DEVICE | Site: SHOULDER | Status: FUNCTIONAL

## 2025-05-19 DEVICE — SUT/ANCH BIOCOMP SWIVELOCK KNOTLSS NMBR2/BLU 4.75X19.1MM: Type: IMPLANTABLE DEVICE | Site: SHOULDER | Status: FUNCTIONAL

## 2025-05-19 DEVICE — SUT/ANCH 2.6/FIBERTAK DBL/LD/W/1.3MM SUT TP SP: Type: IMPLANTABLE DEVICE | Site: SHOULDER | Status: FUNCTIONAL

## 2025-05-19 DEVICE — SYS IMP TENODESIS PROX: Type: IMPLANTABLE DEVICE | Site: SHOULDER | Status: FUNCTIONAL

## 2025-05-19 RX ORDER — SODIUM CHLORIDE, SODIUM LACTATE, POTASSIUM CHLORIDE, CALCIUM CHLORIDE 600; 310; 30; 20 MG/100ML; MG/100ML; MG/100ML; MG/100ML
9 INJECTION, SOLUTION INTRAVENOUS CONTINUOUS PRN
Status: DISCONTINUED | OUTPATIENT
Start: 2025-05-19 | End: 2025-05-19 | Stop reason: HOSPADM

## 2025-05-19 RX ORDER — LIDOCAINE HYDROCHLORIDE 20 MG/ML
INJECTION, SOLUTION EPIDURAL; INFILTRATION; INTRACAUDAL; PERINEURAL AS NEEDED
Status: DISCONTINUED | OUTPATIENT
Start: 2025-05-19 | End: 2025-05-19 | Stop reason: SURG

## 2025-05-19 RX ORDER — ONDANSETRON 2 MG/ML
INJECTION INTRAMUSCULAR; INTRAVENOUS AS NEEDED
Status: DISCONTINUED | OUTPATIENT
Start: 2025-05-19 | End: 2025-05-19 | Stop reason: SURG

## 2025-05-19 RX ORDER — ROCURONIUM BROMIDE 10 MG/ML
INJECTION, SOLUTION INTRAVENOUS AS NEEDED
Status: DISCONTINUED | OUTPATIENT
Start: 2025-05-19 | End: 2025-05-19 | Stop reason: SURG

## 2025-05-19 RX ORDER — IBUPROFEN 800 MG/1
800 TABLET, FILM COATED ORAL EVERY 8 HOURS PRN
Qty: 60 TABLET | Refills: 0 | Status: SHIPPED | OUTPATIENT
Start: 2025-05-19

## 2025-05-19 RX ORDER — MIDAZOLAM HYDROCHLORIDE 2 MG/2ML
2 INJECTION, SOLUTION INTRAMUSCULAR; INTRAVENOUS ONCE
Status: COMPLETED | OUTPATIENT
Start: 2025-05-19 | End: 2025-05-19

## 2025-05-19 RX ORDER — OXYCODONE HYDROCHLORIDE 5 MG/1
5 TABLET ORAL
Status: COMPLETED | OUTPATIENT
Start: 2025-05-19 | End: 2025-05-19

## 2025-05-19 RX ORDER — PROMETHAZINE HYDROCHLORIDE 25 MG/1
25 SUPPOSITORY RECTAL ONCE AS NEEDED
Status: DISCONTINUED | OUTPATIENT
Start: 2025-05-19 | End: 2025-05-19 | Stop reason: HOSPADM

## 2025-05-19 RX ORDER — ROPIVACAINE HYDROCHLORIDE 5 MG/ML
INJECTION, SOLUTION EPIDURAL; INFILTRATION; PERINEURAL
Status: COMPLETED | OUTPATIENT
Start: 2025-05-19 | End: 2025-05-19

## 2025-05-19 RX ORDER — CLINDAMYCIN PHOSPHATE 900 MG/50ML
900 INJECTION, SOLUTION INTRAVENOUS ONCE
Status: COMPLETED | OUTPATIENT
Start: 2025-05-19 | End: 2025-05-19

## 2025-05-19 RX ORDER — PROMETHAZINE HYDROCHLORIDE 12.5 MG/1
25 TABLET ORAL ONCE AS NEEDED
Status: DISCONTINUED | OUTPATIENT
Start: 2025-05-19 | End: 2025-05-19 | Stop reason: HOSPADM

## 2025-05-19 RX ORDER — GABAPENTIN 300 MG/1
300 CAPSULE ORAL NIGHTLY PRN
Qty: 30 CAPSULE | Refills: 0 | Status: SHIPPED | OUTPATIENT
Start: 2025-05-19

## 2025-05-19 RX ORDER — ACETAMINOPHEN 500 MG
1000 TABLET ORAL ONCE
Status: COMPLETED | OUTPATIENT
Start: 2025-05-19 | End: 2025-05-19

## 2025-05-19 RX ORDER — FENTANYL CITRATE 50 UG/ML
100 INJECTION, SOLUTION INTRAMUSCULAR; INTRAVENOUS ONCE
Refills: 0 | Status: COMPLETED | OUTPATIENT
Start: 2025-05-19 | End: 2025-05-19

## 2025-05-19 RX ORDER — FENTANYL CITRATE 50 UG/ML
INJECTION, SOLUTION INTRAMUSCULAR; INTRAVENOUS AS NEEDED
Status: DISCONTINUED | OUTPATIENT
Start: 2025-05-19 | End: 2025-05-19 | Stop reason: SURG

## 2025-05-19 RX ORDER — PROPOFOL 10 MG/ML
VIAL (ML) INTRAVENOUS AS NEEDED
Status: DISCONTINUED | OUTPATIENT
Start: 2025-05-19 | End: 2025-05-19 | Stop reason: SURG

## 2025-05-19 RX ORDER — ONDANSETRON 2 MG/ML
4 INJECTION INTRAMUSCULAR; INTRAVENOUS ONCE AS NEEDED
Status: DISCONTINUED | OUTPATIENT
Start: 2025-05-19 | End: 2025-05-19 | Stop reason: HOSPADM

## 2025-05-19 RX ORDER — DEXAMETHASONE SODIUM PHOSPHATE 4 MG/ML
INJECTION, SOLUTION INTRA-ARTICULAR; INTRALESIONAL; INTRAMUSCULAR; INTRAVENOUS; SOFT TISSUE AS NEEDED
Status: DISCONTINUED | OUTPATIENT
Start: 2025-05-19 | End: 2025-05-19 | Stop reason: SURG

## 2025-05-19 RX ADMIN — ROCURONIUM BROMIDE 50 MG: 10 INJECTION, SOLUTION INTRAVENOUS at 12:38

## 2025-05-19 RX ADMIN — LIDOCAINE HYDROCHLORIDE 100 MG: 20 INJECTION, SOLUTION INTRAVENOUS at 13:15

## 2025-05-19 RX ADMIN — FENTANYL CITRATE 25 MCG: 50 INJECTION, SOLUTION INTRAMUSCULAR; INTRAVENOUS at 14:04

## 2025-05-19 RX ADMIN — CLINDAMYCIN PHOSPHATE 900 MG: 900 INJECTION, SOLUTION INTRAVENOUS at 12:31

## 2025-05-19 RX ADMIN — ROPIVACAINE HYDROCHLORIDE 20 ML: 5 INJECTION, SOLUTION EPIDURAL; INFILTRATION; PERINEURAL at 10:38

## 2025-05-19 RX ADMIN — LIDOCAINE HYDROCHLORIDE 100 MG: 20 INJECTION, SOLUTION INTRAVENOUS at 12:38

## 2025-05-19 RX ADMIN — FENTANYL CITRATE 50 MCG: 50 INJECTION, SOLUTION INTRAMUSCULAR; INTRAVENOUS at 12:38

## 2025-05-19 RX ADMIN — DEXAMETHASONE SODIUM PHOSPHATE 4 MG: 4 INJECTION, SOLUTION INTRAMUSCULAR; INTRAVENOUS at 12:42

## 2025-05-19 RX ADMIN — MIDAZOLAM HYDROCHLORIDE 2 MG: 1 INJECTION, SOLUTION INTRAMUSCULAR; INTRAVENOUS at 10:33

## 2025-05-19 RX ADMIN — OXYCODONE 5 MG: 5 TABLET ORAL at 15:07

## 2025-05-19 RX ADMIN — SUGAMMADEX 200 MG: 100 INJECTION, SOLUTION INTRAVENOUS at 14:35

## 2025-05-19 RX ADMIN — FENTANYL CITRATE 25 MCG: 50 INJECTION, SOLUTION INTRAMUSCULAR; INTRAVENOUS at 14:15

## 2025-05-19 RX ADMIN — ONDANSETRON 4 MG: 2 INJECTION INTRAMUSCULAR; INTRAVENOUS at 13:48

## 2025-05-19 RX ADMIN — SODIUM CHLORIDE, POTASSIUM CHLORIDE, SODIUM LACTATE AND CALCIUM CHLORIDE 9 ML/HR: 600; 310; 30; 20 INJECTION, SOLUTION INTRAVENOUS at 10:19

## 2025-05-19 RX ADMIN — PROPOFOL 150 MG: 10 INJECTION, EMULSION INTRAVENOUS at 12:38

## 2025-05-19 RX ADMIN — HYDROMORPHONE HYDROCHLORIDE 0.5 MG: 1 INJECTION, SOLUTION INTRAMUSCULAR; INTRAVENOUS; SUBCUTANEOUS at 15:06

## 2025-05-19 RX ADMIN — ACETAMINOPHEN 1000 MG: 500 TABLET ORAL at 10:19

## 2025-05-19 RX ADMIN — OXYCODONE 5 MG: 5 TABLET ORAL at 15:26

## 2025-05-19 RX ADMIN — FENTANYL CITRATE 100 MCG: 50 INJECTION, SOLUTION INTRAMUSCULAR; INTRAVENOUS at 10:33

## 2025-05-19 NOTE — ANESTHESIA POSTPROCEDURE EVALUATION
Patient: Steven Mahajan    Procedure Summary       Date: 05/19/25 Room / Location: formerly [facility]  / [facility]    Anesthesia Start: 1231 Anesthesia Stop: 1447    Procedure: RIGHT SHOULDER ARTHROSCOPY WITH ROTATOR CUFF REPAIR, TENODESIS, SUBACROMIAL DECOMPRESSION, CHONDROPLASTY  Right Shoulder Arthroscopy with Rotator cuff repair, possible biceps tenotomy versus tenodesis, possible distal clavicle excision, possible subacromial decompression: Surgery using a joint camera to fix torn tendon and biceps in right shoulder (Right: Shoulder) Diagnosis:       Nontraumatic complete tear of right rotator cuff      (Nontraumatic complete tear of right rotator cuff [M75.121])    Surgeons: Bethel Juan MD Provider: Christo Romero MD    Anesthesia Type: general, regional ASA Status: 2            Anesthesia Type: general, regional    Vitals  Vitals Value Taken Time   /96 05/19/25 15:32   Temp 36.1 °C (96.9 °F) 05/19/25 14:45   Pulse 59 05/19/25 15:37   Resp 16 05/19/25 14:45   SpO2 93 % 05/19/25 15:36   Vitals shown include unfiled device data.        Post Anesthesia Care and Evaluation    Patient location during evaluation: bedside  Patient participation: complete - patient participated  Level of consciousness: awake  Pain management: adequate    Airway patency: patent  PONV Status: none  Cardiovascular status: acceptable and stable  Respiratory status: acceptable  Hydration status: acceptable

## 2025-05-19 NOTE — ANESTHESIA PREPROCEDURE EVALUATION
Anesthesia Evaluation     Patient summary reviewed and Nursing notes reviewed   no history of anesthetic complications:   NPO Solid Status: > 8 hours  NPO Liquid Status: > 2 hours           Airway   Mallampati: I  TM distance: >3 FB  Neck ROM: full  Dental - normal exam     Pulmonary - normal exam   (+) asthma,sleep apnea  Cardiovascular - normal exam  Exercise tolerance: good (4-7 METS)    (+) hypertension      Neuro/Psych- negative ROS  GI/Hepatic/Renal/Endo    (+) thyroid problem hypothyroidism    Musculoskeletal     (+) neck pain  Abdominal  - normal exam   Substance History - negative use     OB/GYN negative ob/gyn ROS         Other - negative ROS       ROS/Med Hx Other: PAT Nursing Notes unavailable.                 Anesthesia Plan    ASA 2     general and regional     intravenous induction     Anesthetic plan, risks, benefits, and alternatives have been provided, discussed and informed consent has been obtained with: patient.    Plan discussed with CRNA.    CODE STATUS:

## 2025-05-19 NOTE — ANESTHESIA PROCEDURE NOTES
Peripheral Block    Pre-sedation assessment completed: 5/19/2025 9:40 AM    Patient reassessed immediately prior to procedure    Patient location during procedure: pre-op  Start time: 5/19/2025 10:34 AM  Stop time: 5/19/2025 10:38 AM  Reason for block: at surgeon's request and post-op pain management  Performed by  Anesthesiologist: Christo Romero MD  Preanesthetic Checklist  Completed: patient identified, IV checked, site marked, risks and benefits discussed, surgical consent, monitors and equipment checked, pre-op evaluation and timeout performed  Prep:  Pt Position: supine (HOB elevated)  Sterile barriers:cap, washed/disinfected hands, sterile barriers, gloves, mask, partial drape and alcohol skin prep  Prep: ChloraPrep  Patient monitoring: blood pressure monitoring, continuous pulse oximetry and EKG  Procedure    Sedation: yes  Performed under: local infiltration  Guidance:ultrasound guided and nerve stimulator    ULTRASOUND INTERPRETATION.  Using ultrasound guidance a 22 G gauge needle was placed in close proximity to the brachial plexus nerve, at which point, under ultrasound guidance anesthetic was injected in the area of the nerve and spread of the anesthesia was seen on ultrasound in close proximity thereto.  There were no abnormalities seen on ultrasound; a digital image was taken; and the patient tolerated the procedure with no complications. Images:still images obtained, printed/placed on chart    Laterality:right  Block Type:interscalene  Injection Technique:single-shot  Needle Type:echogenic  Needle Gauge:22 G (2in)  Resistance on Injection: none    Medications Used: ropivacaine (NAROPIN) 0.5 % injection - Injection   20 mL - 5/19/2025 10:38:00 AM      Post Assessment  Injection Assessment: negative aspiration for heme, no paresthesia on injection and incremental injection  Patient Tolerance:comfortable throughout block  Complications:no  Additional Notes  The block or continuous infusion is  requested by the referring physician for management of postoperative pain, or pain related to a procedure. Ultrasound guidance (deemed medically necessary). Painless injection, pt was awake and conversant during the procedure without complications. Needle and surrounding structures visualized throughout procedure. No adverse reactions or complications seen during this period. Post-procedure image showed no signs of complication, and anatomy was consistent with an uncomplicated nerve blockade.  Performed by: Christo Romero MD

## 2025-05-19 NOTE — DISCHARGE INSTRUCTIONS
DISCHARGE INSTRUCTIONS  Post-Operative  Arthroscopic Shoulder Surgery      For your surgery you had:  General anesthesia (you may have a sore throat for the first 24 hours)  IV sedation.  Local anesthesia  Monitored anesthesia care  You may experience dizziness, drowsiness, or light-headedness for several hours following surgery/procedure.  Do not stay alone today or tonight.  Limit your activity for 24 hours.  Resume your diet slowly.  Follow whatever special dietary instructions you may have been given by your doctor.  You should not drive or operate machinery or drink alcohol for 24 hours or while you are taking pain medication.  You should not sign legally binding documents.  Post-Operative Day #1/TUESDAY is counted as the 1st day after surgery.  [x] If you had a regional block, you will not have control of your arm for up to 12 hours.  Protect the arm with a sling or follow your physician's specific instructions.  Post-Operative Day #2/WEDNESDAY  Remove your dressing.  Under the dressing you will either have sutures or staples.  Change dressing once or twice daily.  Place a dry dressing or band-aids over the small incisions.  Prior to dressing change, wash your hands.  Post-Operative Day #2/WEDNESDAY  You may shower.  During the shower, you may let the water hit the incision and roll down but do not scrub or place any soap on the incision.  Do not soak it.  Pat it dry and do not put any ointments on the incision unless directed by surgeon. Then replace the dry dressing.    General Instructions  [x] Use ice pack to shoulder for 72 hours.  This can help with reducing swelling and pain relief.  Apply 20 minutes on and 20 minutes off.  Never place ice directly on skin.  Avoid getting dressing wet.  The Cold Therapy System can help reduce swelling and decrease pain.  Utilize device for 30-60 minutes per session, with 30-60 minute breaks in between sessions.  It is recommended to use, as directed, for the first 72  hours after surgery until bedtime.  After 72 hours, continue using the device as needed until your follow-up appointment with your physician.  Never place directly on skin.  Please refer to the instruction sheet given.  Keep arm elevated with sling to decrease swelling and minimize throbbing pain.  The sling will provide support for your shoulder.  It is important to remove the sling 3-5 times daily to work on range-of-motion of the elbow, wrist and hand.  You will receive a prescription for physical therapy, unless otherwise instructed by physician.  After most shoulder surgeries, it is permissible to start exercises by slowing trying to crawl your fingers up a wall until your arm is parallel to the floor.  While doing this support the affected arm at the elbow or closer to the shoulder.  You may also lean over and let the arm and hand do small or large circles or write the alphabet with your hanging arm to keep it loose.  It is normal to have some pain with these gentle exercises.  The exercises help reduce swelling and pain overall and help to avoid a stiff shoulder post-operatively.  It is okay to come out of the sling for showering or for certain activities of daily living but avoid any lifting or carrying with the affected arm until instructed by the physician especially if you have had a repair of the rotator cuff or some type of reconstructive procedure.  It is okay to come out of the sling and support the arm with a pillow if you remain sedentary.  In general, sling use is preferred following a repair or reconstruction for 4 weeks.  If you have had a SAD (sub acromial decompression), continue sling use more liberally because there was not a repair or reconstruction that could be harmed.          DISCHARGE INSTRUCTIONS  Post-Operative   Arthroscopic Shoulder Surgery      Exercise fingers for 10 minutes every hour while awake.  The physician will typically inform the family and the patient whether or not a  repair or reconstruction could be harmed.  If you have had a rotator cuff repair or reconstructive procedure, do not let the arm drop down suddenly from an elevated position down to your side despite improvements made in pain and over the 3 months following repair and reconstruction.  It generally takes 8-12 weeks before the repair or reconstruction does not rely on sutures and anchors that are placed for the repair.  You are generally given a prescription for a narcotic medication.  Take as prescribed.  You may use over-the-counter anti-inflammatory medications such as Motrin or Aleve for additional pain or fever reduction if not allergic.  If you are taking the pain medication prescribed, do not take Tylenol too unless you consult with the pharmacist. The pain medications generally have Tylenol in them and too much Tylenol can be harmful.  Sometimes it is recommended to take an anti-inflammatory in between doses of prescription pain medication if additional pain relief is needed.  Consult with your physician or your pharmacist before taking over-the-counter pain medications/anti-inflammatories.  It is not uncommon to have a fever post-operatively especially in the first 24-48 hours.  Anti-inflammatories can be used for fever reduction also.  It is normal to have some redness or irritation around skin sutures or staples, however, if you have any expanding areas of redness with a persistent fever and increasing pain notify the physician as soon as possible.  The incidence of blood clots is low following arthroscopic procedures, however, if you notice any increasing pain or swelling in your calves or legs, contact the physician as soon as possible.   It is difficult to sleep in the customary fashion following a shoulder surgery.  It is usually necessary to sleep with the shoulder above the level of the heart such as in a recliner, couch or with the head of the bed elevated.  This should slowly improve over the weeks  following shoulder surgery.  If unable to urinate 6 to 8 hours after surgery or urinating frequently in small amounts, notify the physician or go to the nearest Emergency Room.    SPECIAL INSTRUCTIONS:    May discharge home   Follow-up 2 weeks   Call with any problems   Avoid active elbow flexion for 4 weeks, shoulder immobilizer, may remove to do pendulum exercises and elbow range of motion.  Passive elbow flexion.   No lifting more than a pound with extremity   Continue home pain medication   Ibuprofen and gabapentin prescription written   Dressing change postoperative day #2 and then daily.  Keep Steri-Strips intact.  May shower with Steri-Strips in place.   No bathing or soaking   Call with any problems   Cold therapy         NOTIFY YOUR PHYSICIAN IF YOU EXPERIENCE:  Numbness of fingers.  Inability to move fingers.  Extreme coldness, paleness or blue dis-coloration of fingers.  Any pain other than from the incision, such as swelling of the arm that blocks circulation of fingers.  Follow verbal instructions from your doctor.  Medications per physician instructions as indicated on Discharge Medication Information Sheet.    You should see CORDELIA CHURCHILL for follow-up care on  JUNE 2, 2025 @ 1:30 PM    Missing your appointment/follow-up could lead to serious complications  If you have an emergency and cannot reach your doctor, go to an Emergency Room nearest your home.

## 2025-05-19 NOTE — OP NOTE
SHOULDER ARTHROSCOPY WITH ROTATOR CUFF REPAIR  Procedure Report    Patient Name:  Steven Mahajan  YOB: 1955    Date of Surgery:  5/19/2025     Indications:  Patient has a rotator cuff tear and has failed conservative treatment. Risks and benefits of surgery were discussed, including bleeding, infection, damage to neurovascular structures, anesthesia complications including death, continued pain and disability, need for additional procedures, among others. Informed consent was obtained and they wished to proceed.    Pre-op Diagnosis:   Nontraumatic complete tear of right rotator cuff [M75.121]       Post-Op Diagnosis Codes:     * Nontraumatic complete tear of right rotator cuff [M75.121]    Procedure:  Procedure(s):  RIGHT SHOULDER ARTHROSCOPY WITH MINI OPEN ROTATOR CUFF REPAIR, biceps TENODESIS, SUBACROMIAL DECOMPRESSION, CHONDROPLASTY    Staff:  Surgeon(s):  Bethel Juan MD    Assistant: Cheyenne Aldridge    Anesthesia: General    Estimated Blood Loss: 25 mL    Implants:    Implant Name Type Inv. Item Serial No.  Lot No. LRB No. Used Action   SYS IMP TENODESIS PROX - [id number] Implant SYS IMP TENODESIS PROX  [employer] [id number] Right 1 Implanted   SUT FIBERLINK BR PB NO2 W/CLSDLP 1.5IN - [id number] Implant SUT FIBERLINK BR PB NO2 W/CLSDLP 1.5IN  [employer] [id number] Right 1 Implanted   SUT/ANCH 2.6/FIBERTAK DBL/LD/W/1.3MM SUT TP SP - [id number] Implant SUT/ANCH 2.6/FIBERTAK DBL/LD/W/1.3MM SUT TP SP  [employer] [id number] Right 3 Implanted   SUT/ANCH BIOCOMP SWIVELOCK KNOTLSS NMBR2/GATO 4.75X19.1MM - [id number] Implant SUT/ANCH BIOCOMP SWIVELOCK KNOTLSS NMBR2/GATO 4.75X19.1MM  [employer] [id number] Right 2 Implanted       Specimen:          None        Findings: rotator cuff tear, partial biceps tendon tear, chondromalacia    Complications: none    Description of Procedure: The operative site was marked in preoperative holding area.  Interscalene nerve block was performed by the anesthesia  provider.  The patient was brought the operating room and general anesthesia was applied.  There were placed in the lateral decubitus position.  An axillary roll was placed and the patient was secured with a deflated beanbag.  The legs were padded and SCD boots were placed.  The contralateral limb was placed on an arm board and the affected limb was prepped and draped in usual sterile fashion and placed into a sterile traction arm almendarez.  Preoperative antibiotic was given.  Formal timeout was held.    Standard posterior portal was established and the arthroscope was inserted into the glenohumeral joint.  An anterior portal was established in the rotator cuff interval and a cannula was placed anteriorly.  The joint was inspected and the intra-articular and findings included full-thickness rotator cuff tear involving the supraspinatus and infraspinatus.  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  This was debrided with a motorized shaver for chondroplasty.  There is a partial-thickness tear involving the intra-articular long head biceps tendon.  The biceps tendon tear involving greater than 50% of the tendon thickness.  Therefore a biceps tenodesis was performed.  The ablation probe was used to release the biceps tendon and the labrum was debrided with a motorized shaver.    The arthroscope was removed and reinserted into the subacromial space posteriorly.  The lateral portal was established of the lateral acromion.  A motorized shaver and ablation probe were used to clear the subacromial space of adhesions and bursitis.  A motorized bur was used to resect approximately 1 cm from the anterior acromion and flatten the acromion from anterior to posterior and lateral to medial.    The subacromial findings include large retracted rotator cuff tear involving the entirety of the supra and infraspinatus.  There is around 3 cm of retraction of  the rotator cuff with thinning of the rotator cuff.  Downsloping acromion creating subacromial impingement.    At this point the arthroscope was removed and the lateral portal was extended into a 3 cm incision.  The deltoid was split and the bursa was excised.  The rotator cuff tear was identified and mobilized.  A bleeding surface was treated at the greater tuberosity and 3  Arthrex fiber tack anchors were placed.  A scorpion suture passer was used to pass sutures through the rotator cuff and they were tied in alternating  knot fashion.  1 suture from each anchor was then passed laterally through a swivel lock anchor which was placed anteriorly and posteriorly.  The sutures were tensioned and the anchor was deployed.  The sutures were cut and the repair was secure.  The repair suture from the swivel lock was then used to create an additional anterior and posterior repair by passing the repair suture through the anteriormost and posterior most aspect of the tear and passing this through the FiberLink suture through the locking mechanism of the anchor to tension the suture and repair the tissue anteriorly and posteriorly.    The wound was irrigated and was closed with #1 Vicryl at the deltoid fascia level, 2-0 Vicryl at the subcutaneous level, and running Monocryl suture at the skin.  Mastisol Steri-Strips and sterile dressing was placed.  The portal incisions were closed with nylon suture.    Attention was then turned to the biceps tenodesis.  Small incision was made in the axillary region and the subcutaneous tissues were bluntly dissected.  The biceps tendon was delivered into the wound and was sutured with a #2 fiber loop in a running locking fashion.  The excess tendon was removed.  The tendon was sized to 5 mm.  The proximal humerus was exposed and a bicortical drill pin was placed in the proximal humerus just below the pectoralis major tendon.  A unicortical 5 mm drill was used to drill the near cortex.  The  bony debris was irrigated.  The biceps button  was used to pass the button bicortically and the sutures which were threaded through the button were pulled docking the tendon into neocortical hole.  A free needle was used to pass a suture through the tendon and this was tied, securing the repair.  The wound was irrigated and local anesthetic was injected.  It was closed with 3-0 undyed Vicryl running Monocryl Mastisol and Steri-Strips.    Sterile dressing was placed.  The patient was placed into cold therapy and an immobilizer.  The patient awoke from anesthesia in stable condition.  There is no complications.  All counts were correct.    Assistant: Cheyenne Aldridge  was responsible for performing the following activities: Retraction, Suction, Irrigation, and Placing Dressing and their skilled assistance was necessary for the success of this case.    SURGICAL APPROACH: Shady Juan MD     Date: 5/19/2025  Time: 15:41 EDT

## 2025-05-23 RX ORDER — IBUPROFEN 800 MG/1
800 TABLET, FILM COATED ORAL EVERY 8 HOURS PRN
Qty: 60 TABLET | Refills: 0 | OUTPATIENT
Start: 2025-05-23

## 2025-06-02 ENCOUNTER — OFFICE VISIT (OUTPATIENT)
Dept: ORTHOPEDIC SURGERY | Facility: CLINIC | Age: 70
End: 2025-06-02
Payer: MEDICARE

## 2025-06-02 VITALS
BODY MASS INDEX: 24.5 KG/M2 | HEIGHT: 71 IN | WEIGHT: 175 LBS | HEART RATE: 61 BPM | SYSTOLIC BLOOD PRESSURE: 116 MMHG | DIASTOLIC BLOOD PRESSURE: 71 MMHG | OXYGEN SATURATION: 96 %

## 2025-06-02 DIAGNOSIS — Z47.89 AFTERCARE FOLLOWING SURGERY OF THE MUSCULOSKELETAL SYSTEM: Primary | ICD-10-CM

## 2025-06-02 PROCEDURE — 1159F MED LIST DOCD IN RCRD: CPT | Performed by: PHYSICIAN ASSISTANT

## 2025-06-02 PROCEDURE — 99024 POSTOP FOLLOW-UP VISIT: CPT | Performed by: PHYSICIAN ASSISTANT

## 2025-06-02 PROCEDURE — 1160F RVW MEDS BY RX/DR IN RCRD: CPT | Performed by: PHYSICIAN ASSISTANT

## 2025-06-02 NOTE — PROGRESS NOTES
Chief Complaint  Follow-up of the Right Shoulder and Suture / Staple Removal    Subjective          History of Present Illness      Steven Mahajan is a 70 y.o. male  presents to [facility] for     Patient presents for 2-week postoperative evaluation of right shoulder arthroscopic biceps tenodesis subacromial decompression chondroplasty and mini open rotator cuff repair 2025.  Patient states he is doing well he states the incisions are healing well and denies redness drainage or dehiscence.  He states pain medicine is coming from his primary care physician he states the pain medicine does help him.  He is attending therapy at [facility] he presents with his sling and has been compliant with sling use he also has been doing his home exercises.      Allergies   Allergen Reactions    Sulfa Antibiotics GI Intolerance and Unknown - Low Severity     Projectile vomiting     Cephalexin GI Intolerance        Social History     Socioeconomic History    Marital status:    Tobacco Use    Smoking status: Former     Current packs/day: 0.00     Average packs/day: 1 pack/day for 29.2 years (29.2 ttl pk-yrs)     Types: Cigarettes     Start date: 1975     Quit date: 1989     Years since quittin.8    Smokeless tobacco: Never    Tobacco comments:     quit    Vaping Use    Vaping status: Never Used   Substance and Sexual Activity    Alcohol use: Not Currently     Comment: occasionally     Drug use: Yes     Types: Marijuana     Comment: do not use 24 hours prior to surgery    Sexual activity: Defer     Partners: Female     Birth control/protection: I.U.D.        REVIEW OF SYSTEMS    Constitutional: Awake alert and oriented x3, no acute distress, denies fevers, chills, weight loss  Respiratory: No respiratory distress  Vascular: Brisk cap refill, Intact distal pulses, No cyanosis, compartments soft with no signs or symptoms of compartment syndrome or DVT.   Cardiovascular: Denies chest  "pain, shortness of breath  Skin: Denies rashes, acute skin changes  Neurologic: Denies headache, loss of consciousness  MSK: Right shoulder pain      Objective   Vital Signs:   /71   Pulse 61   Ht 180.3 cm (70.98\")   Wt 79.4 kg (175 lb)   SpO2 96%   BMI 24.42 kg/m²     Body mass index is 24.42 kg/m².    Physical Exam       Right shoulder: Incisions are healing well no erythema, no drainage or dehiscence, no signs of infection elbow wrist hand range of motion intact/appropriate, shoulder range of motion limited secondary stiffness and pain patient will wiggle fingers and thumb sensation intact to light touch.      Procedures    Imaging Results (Most Recent)       None                   Assessment and Plan    Diagnoses and all orders for this visit:    1. Aftercare following surgery of RIGHT SHOULDER ARTHROSCOPY WITH MINI OPEN ROTATOR CUFF REPAIR, biceps TENODESIS, SUBACROMIAL DECOMPRESSION, CHONDROPLASTY 5/19/25 (Primary)        Reviewed operative images and operative report with the patient discussed diagnosis and treatment options with him he was advised to continue sling use for another 4 weeks, continue pain meds as needed his PCP will provide these for him.  We discussed continuing therapy and home exercises as instructed.  Sutures/staples removed in office today. Steri-strips applied. Discussed incision care/hygiene. May shower, but do not submerge in water until fully healed.  Advised patient that if any concerning symptoms regarding incision appearance occur that they should call us right away, patient expressed understanding.  Follow-up 4 weeks for recheck     Call or return if worsening symptoms.    Follow Up   Return in about 4 weeks (around 6/30/2025) for Recheck.  Patient was given instructions and counseling regarding his condition or for health maintenance advice. Please see specific information pulled into the AVS if appropriate.       EMR Dragon/Transcription disclaimer:  Part of this note " may be an electronic transcription/translation of spoken language to printed text using the Dragon Dictation System

## 2025-06-12 LAB
QT INTERVAL: 376 MS
QTC INTERVAL: 336 MS

## 2025-07-01 ENCOUNTER — OFFICE VISIT (OUTPATIENT)
Dept: ORTHOPEDIC SURGERY | Facility: CLINIC | Age: 70
End: 2025-07-01
Payer: MEDICARE

## 2025-07-01 VITALS — HEIGHT: 71 IN | WEIGHT: 175 LBS | BODY MASS INDEX: 24.5 KG/M2

## 2025-07-01 DIAGNOSIS — Z47.89 AFTERCARE FOLLOWING SURGERY OF THE MUSCULOSKELETAL SYSTEM: Primary | ICD-10-CM

## 2025-07-01 NOTE — PROGRESS NOTES
"Chief Complaint  Follow-up of the Right Shoulder       Subjective      Steven Mahajan presents to [facility] for a follow up for his right shoulder. He underwent a right shoulder arthroscopy with rotator cuff repair, biceps tenodesis, subacromial decompression and chondroplasty performed on 25. He presents today for his six week post-operative appointment. He is still attending out patient physical therapy and presents today in a sling.      Allergies   Allergen Reactions    Sulfa Antibiotics GI Intolerance and Unknown - Low Severity     Projectile vomiting     Cephalexin GI Intolerance        Social History     Socioeconomic History    Marital status:    Tobacco Use    Smoking status: Former     Current packs/day: 0.00     Average packs/day: 1 pack/day for 29.2 years (29.2 ttl pk-yrs)     Types: Cigarettes     Start date: 1975     Quit date: 1989     Years since quittin.9    Smokeless tobacco: Never    Tobacco comments:     quit    Vaping Use    Vaping status: Never Used   Substance and Sexual Activity    Alcohol use: Not Currently     Comment: occasionally     Drug use: Yes     Types: Marijuana     Comment: do not use 24 hours prior to surgery    Sexual activity: Defer     Partners: Female     Birth control/protection: I.U.D.        I reviewed the patient's chief complaint, history of present illness, review of systems, past medical history, surgical history, family history, social history, medications, and allergy list.     Review of Systems     Constitutional: Denies fevers, chills, weight loss  Cardiovascular: Denies chest pain, shortness of breath  Skin: Denies rashes, acute skin changes  Neurologic: Denies headache, loss of consciousness  MSK: right shoulder pain       Vital Signs:   Ht 180.3 cm (70.98\")   Wt 79.4 kg (175 lb)   BMI 24.42 kg/m²            Ortho Exam    Physical Exam  General:Alert. No acute distress   Right upper extremity: well " healed surgicial incision, passive forward elevation  to 150 degrees, active forward elevation  to 90 degrees , abduction to 90 degrees, internal rotation to 50 degrees, external rotation  to 65 degrees, full finger, wrist and elbow range of motion,   internal rotation to L5, 4/5 rotator cuff strength, neurovascularly intact, positive  pulses, sensation intact to the medial, radial and ulnar nerve     Procedures    Imaging Results (Most Recent)       None             Result Review :       No results found.           Assessment and Plan     Diagnoses and all orders for this visit:    1. Aftercare following surgery of RIGHT SHOULDER ARTHROSCOPY WITH MINI OPEN ROTATOR CUFF REPAIR, biceps TENODESIS, SUBACROMIAL DECOMPRESSION, CHONDROPLASTY 5/19/25 (Primary)        The patient presents here today for a follow up for his right shoulder.     He is overall doing well and will continue outpatient physical therapy.     He can discontinue the sling and will continue range of motion and will continue to work on strengthening.     He will avoid any heavy lifting, pulling and pushing with his right shoulder.  Advised not to lift any more than 5 - 10 pounds.     Call or return if worsening symptoms.    Follow Up     6 weeks     Patient was given instructions and counseling regarding his condition or for health maintenance advice. Please see specific information pulled into the AVS if appropriate.     Scribed for Bethel Juan MD by Rosmery Hurst.  07/01/25   11:10 EDT    I have personally performed the services described in this document as scribed by the above individual and it is both accurate and complete. Bethel Juan MD 07/01/25

## 2025-08-12 ENCOUNTER — OFFICE VISIT (OUTPATIENT)
Dept: ORTHOPEDIC SURGERY | Facility: CLINIC | Age: 70
End: 2025-08-12
Payer: MEDICARE

## 2025-08-12 VITALS — WEIGHT: 188 LBS | HEIGHT: 71 IN | BODY MASS INDEX: 26.32 KG/M2

## 2025-08-12 DIAGNOSIS — Z47.89 AFTERCARE FOLLOWING SURGERY OF THE MUSCULOSKELETAL SYSTEM: Primary | ICD-10-CM

## 2025-08-12 PROCEDURE — 99024 POSTOP FOLLOW-UP VISIT: CPT | Performed by: ORTHOPAEDIC SURGERY

## 2025-08-13 ENCOUNTER — TRANSCRIBE ORDERS (OUTPATIENT)
Dept: ADMINISTRATIVE | Facility: HOSPITAL | Age: 70
End: 2025-08-13
Payer: MEDICARE

## 2025-08-13 DIAGNOSIS — R97.20 ELEVATED PSA: Primary | ICD-10-CM

## 2025-08-25 ENCOUNTER — OFFICE VISIT (OUTPATIENT)
Dept: UROLOGY | Age: 70
End: 2025-08-25
Payer: MEDICARE

## 2025-08-25 VITALS — BODY MASS INDEX: 24.5 KG/M2 | WEIGHT: 175 LBS | HEIGHT: 71 IN | RESPIRATION RATE: 12 BRPM

## 2025-08-25 DIAGNOSIS — N52.9 ERECTILE DYSFUNCTION, UNSPECIFIED ERECTILE DYSFUNCTION TYPE: ICD-10-CM

## 2025-08-25 DIAGNOSIS — R97.20 ELEVATED PROSTATE SPECIFIC ANTIGEN (PSA): Primary | ICD-10-CM

## 2025-08-25 DIAGNOSIS — N40.1 BPH WITH OBSTRUCTION/LOWER URINARY TRACT SYMPTOMS: ICD-10-CM

## 2025-08-25 DIAGNOSIS — N13.8 BPH WITH OBSTRUCTION/LOWER URINARY TRACT SYMPTOMS: ICD-10-CM

## 2025-08-25 LAB — URINE VOLUME: 0

## 2025-08-25 PROCEDURE — 1159F MED LIST DOCD IN RCRD: CPT | Performed by: NURSE PRACTITIONER

## 2025-08-25 PROCEDURE — 99213 OFFICE O/P EST LOW 20 MIN: CPT | Performed by: NURSE PRACTITIONER

## 2025-08-25 PROCEDURE — 1160F RVW MEDS BY RX/DR IN RCRD: CPT | Performed by: NURSE PRACTITIONER

## (undated) DEVICE — THE SINGLE USE ETRAP – POLYP TRAP IS USED FOR SUCTION RETRIEVAL OF ENDOSCOPICALLY REMOVED POLYPS.: Brand: ETRAP

## (undated) DEVICE — BUR BRL FORMLA 12FLUT 4MM

## (undated) DEVICE — DRESSING,GAUZE,XEROFORM,CURAD,1"X8",ST: Brand: CURAD

## (undated) DEVICE — SOL IRRG H2O PL/BG 1000ML STRL

## (undated) DEVICE — SUT PROLN 0 CT1 30IN 8424H

## (undated) DEVICE — SINGLE-USE BIOPSY FORCEPS: Brand: RADIAL JAW 4

## (undated) DEVICE — CANN TWST IN TRPL DAM 7CM 8.25MM

## (undated) DEVICE — STERILE POLYISOPRENE POWDER-FREE SURGICAL GLOVES: Brand: PROTEXIS

## (undated) DEVICE — Device

## (undated) DEVICE — INTENDED FOR TISSUE SEPARATION, AND OTHER PROCEDURES THAT REQUIRE A SHARP SURGICAL BLADE TO PUNCTURE OR CUT.: Brand: BARD-PARKER ® CARBON RIB-BACK BLADES

## (undated) DEVICE — KT INST FOR FIBERTAK ANCHR 2.6MM DISP

## (undated) DEVICE — STERILE POLYISOPRENE POWDER-FREE SURGICAL GLOVES WITH EMOLLIENT COATING: Brand: PROTEXIS

## (undated) DEVICE — TBG INFLOW/OUTFLOW DUALWAVE 1P/U

## (undated) DEVICE — SOL IRR NACL 0.9PCT 3000ML

## (undated) DEVICE — 90-S CRUISE, SUCTION PROBE, NON-BENDABLE, MAX CUT LEVEL 1: Brand: SERFAS ENERGY

## (undated) DEVICE — BLD CUT FORMLA AGGR PLS 5.0MM

## (undated) DEVICE — PAD GRND REM POLYHESIVE A/ DISP

## (undated) DEVICE — THE STERILE LIGHT HANDLE COVER IS USED WITH STERIS SURGICAL LIGHTING AND VISUALIZATION SYSTEMS.

## (undated) DEVICE — GLV SURG SENSICARE PI ORTHO SZ8 LF STRL

## (undated) DEVICE — SUT ETHLN 3-0 FS118IN 663H

## (undated) DEVICE — Device: Brand: DEFENDO AIR/WATER/SUCTION AND BIOPSY VALVE

## (undated) DEVICE — SHOULDER ARTHROSCOPY-LF: Brand: MEDLINE INDUSTRIES, INC.

## (undated) DEVICE — STRIP,CLOSURE,WOUND,MEDI-STRIP,1/2X4: Brand: MEDLINE

## (undated) DEVICE — SUT MONOCRYL PLS ANTIB UND 3/0  PS1 27IN

## (undated) DEVICE — SNAR E/S POLYP SNAREMASTER OVL/10MM 2.8X2300MM YEL

## (undated) DEVICE — PENCL SMOKE/EVAC MEGADYNE TELESCP 10FT

## (undated) DEVICE — SLV SCD KN/LEN ADJ EXPRSS BLENDED MD 1P/U

## (undated) DEVICE — ADHS LIQ MASTISOL 2/3ML

## (undated) DEVICE — SLV DISTRACTION STAR VELCRO XL DISP

## (undated) DEVICE — SOLIDIFIER LIQLOC PLS 1500CC BT

## (undated) DEVICE — COLON KIT: Brand: MEDLINE INDUSTRIES, INC.

## (undated) DEVICE — TP NDL HD/SCORPION W/MEGALOADER 1P/U